# Patient Record
Sex: MALE | Race: WHITE | Employment: OTHER | URBAN - NONMETROPOLITAN AREA
[De-identification: names, ages, dates, MRNs, and addresses within clinical notes are randomized per-mention and may not be internally consistent; named-entity substitution may affect disease eponyms.]

---

## 2017-01-12 ENCOUNTER — OFFICE VISIT (OUTPATIENT)
Dept: UROLOGY | Age: 67
End: 2017-01-12

## 2017-01-12 ENCOUNTER — TELEPHONE (OUTPATIENT)
Dept: UROLOGY | Age: 67
End: 2017-01-12

## 2017-01-12 VITALS
SYSTOLIC BLOOD PRESSURE: 158 MMHG | BODY MASS INDEX: 35 KG/M2 | DIASTOLIC BLOOD PRESSURE: 88 MMHG | HEIGHT: 71 IN | WEIGHT: 250 LBS

## 2017-01-12 DIAGNOSIS — I10 ESSENTIAL HYPERTENSION: ICD-10-CM

## 2017-01-12 DIAGNOSIS — C61 PROSTATE CANCER (HCC): Primary | ICD-10-CM

## 2017-01-12 DIAGNOSIS — Z01.818 PRE-OP TESTING: ICD-10-CM

## 2017-01-12 DIAGNOSIS — E78.49 OTHER HYPERLIPIDEMIA: ICD-10-CM

## 2017-01-12 DIAGNOSIS — C61 CANCER OF PROSTATE W/MED RECUR RISK (T2B-C OR GLEASON 7 OR PSA 10-20) (HCC): Primary | ICD-10-CM

## 2017-01-12 PROCEDURE — 99214 OFFICE O/P EST MOD 30 MIN: CPT | Performed by: UROLOGY

## 2017-01-23 ENCOUNTER — TELEPHONE (OUTPATIENT)
Dept: UROLOGY | Age: 67
End: 2017-01-23

## 2017-02-14 ENCOUNTER — TELEPHONE (OUTPATIENT)
Dept: UROLOGY | Age: 67
End: 2017-02-14

## 2017-02-21 LAB
ALBUMIN SERPL-MCNC: 4.3 G/DL
ALP BLD-CCNC: 69 U/L
ALT SERPL-CCNC: 30 U/L
AST SERPL-CCNC: 25 U/L
BASOPHILS ABSOLUTE: 0 /ΜL
BASOPHILS RELATIVE PERCENT: 0.7 %
BILIRUB SERPL-MCNC: 0.7 MG/DL (ref 0.1–1.4)
BUN BLDV-MCNC: 16 MG/DL
CALCIUM SERPL-MCNC: 9.3 MG/DL
CHLORIDE BLD-SCNC: 99 MMOL/L
CO2: 29.3 MMOL/L
CREAT SERPL-MCNC: 0.9 MG/DL
EOSINOPHILS ABSOLUTE: 0.3 /ΜL
EOSINOPHILS RELATIVE PERCENT: 5.2 %
GFR CALCULATED: NORMAL
GLUCOSE BLD-MCNC: 125 MG/DL
HCT VFR BLD CALC: 44.4 % (ref 41–53)
HEMOGLOBIN: 15.6 G/DL (ref 13.5–17.5)
LYMPHOCYTES ABSOLUTE: 1.7 /ΜL
LYMPHOCYTES RELATIVE PERCENT: 27.5 %
MCH RBC QN AUTO: 30.7 PG
MCHC RBC AUTO-ENTMCNC: 35.1 G/DL
MCV RBC AUTO: 87.4 FL
MONOCYTES ABSOLUTE: 0.8 /ΜL
MONOCYTES RELATIVE PERCENT: 12.8 %
NEUTROPHILS ABSOLUTE: 3.3 /ΜL
NEUTROPHILS RELATIVE PERCENT: 53.6 %
PDW BLD-RTO: NORMAL %
PLATELET # BLD: 200 K/ΜL
PMV BLD AUTO: 11.1 FL
POTASSIUM SERPL-SCNC: 3.4 MMOL/L
RBC # BLD: 5.08 10^6/ΜL
SODIUM BLD-SCNC: 141 MMOL/L
TOTAL PROTEIN: 6.5
WBC # BLD: 6.1 10^3/ML

## 2017-02-23 ENCOUNTER — TELEPHONE (OUTPATIENT)
Dept: UROLOGY | Age: 67
End: 2017-02-23

## 2017-02-28 LAB
BILIRUBIN URINE: ABNORMAL MG/DL
BLOOD, URINE: NEGATIVE
CLARITY: ABNORMAL
COLOR: ABNORMAL
GLUCOSE URINE: NEGATIVE
KETONES, URINE: NEGATIVE
LEUKOCYTE ESTERASE, URINE: ABNORMAL
NITRITE, URINE: NEGATIVE
PH UA: 7 (ref 4.5–8)
PROTEIN UA: NEGATIVE
SPECIFIC GRAVITY UA: 1.02 (ref 1–1.03)
UROBILINOGEN, URINE: NORMAL

## 2017-03-01 ENCOUNTER — TELEPHONE (OUTPATIENT)
Dept: UROLOGY | Age: 67
End: 2017-03-01

## 2017-03-02 ENCOUNTER — TELEPHONE (OUTPATIENT)
Dept: UROLOGY | Age: 67
End: 2017-03-02

## 2017-03-08 ENCOUNTER — TELEPHONE (OUTPATIENT)
Dept: UROLOGY | Age: 67
End: 2017-03-08

## 2017-03-16 ENCOUNTER — TELEPHONE (OUTPATIENT)
Dept: UROLOGY | Age: 67
End: 2017-03-16

## 2017-03-16 ENCOUNTER — OFFICE VISIT (OUTPATIENT)
Dept: UROLOGY | Age: 67
End: 2017-03-16

## 2017-03-16 VITALS
WEIGHT: 248 LBS | HEIGHT: 71 IN | DIASTOLIC BLOOD PRESSURE: 92 MMHG | SYSTOLIC BLOOD PRESSURE: 144 MMHG | BODY MASS INDEX: 34.72 KG/M2

## 2017-03-16 DIAGNOSIS — C61 PROSTATE CANCER (HCC): Primary | ICD-10-CM

## 2017-03-16 LAB
BILIRUBIN URINE: NEGATIVE
BLOOD URINE, POC: NEGATIVE
CHARACTER, URINE: CLEAR
COLOR, URINE: YELLOW
GLUCOSE URINE: NEGATIVE MG/DL
KETONES, URINE: NEGATIVE
LEUKOCYTE CLUMPS, URINE: NEGATIVE
NITRITE, URINE: NEGATIVE
PH, URINE: 6.5
PROTEIN, URINE: NEGATIVE MG/DL
SPECIFIC GRAVITY, URINE: 1.02 (ref 1–1.03)
UROBILINOGEN, URINE: 0.2 EU/DL

## 2017-03-16 PROCEDURE — 81003 URINALYSIS AUTO W/O SCOPE: CPT | Performed by: UROLOGY

## 2017-03-16 PROCEDURE — 99214 OFFICE O/P EST MOD 30 MIN: CPT | Performed by: UROLOGY

## 2017-03-16 RX ORDER — LANOLIN ALCOHOL/MO/W.PET/CERES
500 CREAM (GRAM) TOPICAL NIGHTLY
COMMUNITY

## 2017-03-24 ENCOUNTER — TELEPHONE (OUTPATIENT)
Dept: UROLOGY | Age: 67
End: 2017-03-24

## 2017-03-28 ENCOUNTER — TELEPHONE (OUTPATIENT)
Dept: UROLOGY | Age: 67
End: 2017-03-28

## 2017-03-28 DIAGNOSIS — C61 PROSTATE CANCER (HCC): Primary | ICD-10-CM

## 2017-03-28 DIAGNOSIS — Z97.8 FOLEY CATHETER IN PLACE: ICD-10-CM

## 2017-04-03 ENCOUNTER — TELEPHONE (OUTPATIENT)
Dept: UROLOGY | Age: 67
End: 2017-04-03

## 2017-04-05 ENCOUNTER — TELEPHONE (OUTPATIENT)
Dept: UROLOGY | Age: 67
End: 2017-04-05

## 2017-04-11 ENCOUNTER — NURSE ONLY (OUTPATIENT)
Dept: UROLOGY | Age: 67
End: 2017-04-11

## 2017-04-11 VITALS
BODY MASS INDEX: 34.72 KG/M2 | WEIGHT: 248 LBS | SYSTOLIC BLOOD PRESSURE: 144 MMHG | DIASTOLIC BLOOD PRESSURE: 82 MMHG | HEIGHT: 71 IN

## 2017-04-11 DIAGNOSIS — C61 CANCER OF PROSTATE W/MED RECUR RISK (T2B-C OR GLEASON 7 OR PSA 10-20) (HCC): Primary | ICD-10-CM

## 2017-04-11 PROCEDURE — 99024 POSTOP FOLLOW-UP VISIT: CPT | Performed by: UROLOGY

## 2017-04-26 ENCOUNTER — TELEPHONE (OUTPATIENT)
Dept: UROLOGY | Age: 67
End: 2017-04-26

## 2017-05-24 ENCOUNTER — TELEPHONE (OUTPATIENT)
Dept: UROLOGY | Age: 67
End: 2017-05-24

## 2017-06-30 LAB — PROSTATE SPECIFIC ANTIGEN: NORMAL NG/ML

## 2017-07-11 ENCOUNTER — OFFICE VISIT (OUTPATIENT)
Dept: UROLOGY | Age: 67
End: 2017-07-11

## 2017-07-11 VITALS
DIASTOLIC BLOOD PRESSURE: 78 MMHG | BODY MASS INDEX: 33.96 KG/M2 | SYSTOLIC BLOOD PRESSURE: 150 MMHG | HEIGHT: 71 IN | WEIGHT: 242.6 LBS

## 2017-07-11 DIAGNOSIS — C61 PROSTATE CANCER (HCC): ICD-10-CM

## 2017-07-11 DIAGNOSIS — N52.31 ERECTILE DYSFUNCTION FOLLOWING RADICAL PROSTATECTOMY: ICD-10-CM

## 2017-07-11 DIAGNOSIS — N39.498 OTHER URINARY INCONTINENCE: Primary | ICD-10-CM

## 2017-07-11 LAB — POST VOID RESIDUAL (PVR): 184 ML

## 2017-07-11 PROCEDURE — 99213 OFFICE O/P EST LOW 20 MIN: CPT | Performed by: UROLOGY

## 2017-07-11 PROCEDURE — 51798 US URINE CAPACITY MEASURE: CPT | Performed by: UROLOGY

## 2017-07-11 RX ORDER — OXYBUTYNIN CHLORIDE 5 MG/1
5 TABLET ORAL 3 TIMES DAILY
Qty: 90 TABLET | Refills: 3 | Status: SHIPPED | OUTPATIENT
Start: 2017-07-11 | End: 2017-09-13 | Stop reason: ALTCHOICE

## 2017-07-11 RX ORDER — TADALAFIL 5 MG
5 TABLET ORAL PRN
Qty: 30 TABLET | Refills: 3 | Status: SHIPPED | OUTPATIENT
Start: 2017-07-11 | End: 2017-10-12 | Stop reason: SDUPTHER

## 2017-08-07 ENCOUNTER — TELEPHONE (OUTPATIENT)
Dept: UROLOGY | Age: 67
End: 2017-08-07

## 2017-08-07 RX ORDER — FLAVOXATE HYDROCHLORIDE 100 MG/1
100 TABLET ORAL 3 TIMES DAILY PRN
Qty: 30 TABLET | Refills: 3 | Status: SHIPPED | OUTPATIENT
Start: 2017-08-07 | End: 2017-10-03 | Stop reason: SDUPTHER

## 2017-09-13 ENCOUNTER — OFFICE VISIT (OUTPATIENT)
Dept: NEPHROLOGY | Age: 67
End: 2017-09-13
Payer: COMMERCIAL

## 2017-09-13 VITALS
DIASTOLIC BLOOD PRESSURE: 91 MMHG | HEART RATE: 69 BPM | SYSTOLIC BLOOD PRESSURE: 168 MMHG | BODY MASS INDEX: 33.67 KG/M2 | OXYGEN SATURATION: 97 % | WEIGHT: 241.4 LBS

## 2017-09-13 DIAGNOSIS — E87.6 HYPOKALEMIA: Primary | ICD-10-CM

## 2017-09-13 PROCEDURE — 99203 OFFICE O/P NEW LOW 30 MIN: CPT | Performed by: INTERNAL MEDICINE

## 2017-09-13 RX ORDER — CHLORAL HYDRATE 500 MG
3000 CAPSULE ORAL DAILY
COMMUNITY
End: 2019-12-05

## 2017-09-13 RX ORDER — CHLORTHALIDONE 25 MG/1
25 TABLET ORAL DAILY
COMMUNITY
End: 2018-11-12 | Stop reason: ALTCHOICE

## 2017-09-13 RX ORDER — SPIRONOLACTONE 25 MG/1
25 TABLET ORAL DAILY
Qty: 100 TABLET | Refills: 3 | Status: SHIPPED | OUTPATIENT
Start: 2017-09-13 | End: 2018-10-16 | Stop reason: SDUPTHER

## 2017-09-13 RX ORDER — POTASSIUM CHLORIDE 1.5 G/1.77G
20 POWDER, FOR SOLUTION ORAL 2 TIMES DAILY
COMMUNITY
End: 2017-09-13

## 2017-09-13 ASSESSMENT — ENCOUNTER SYMPTOMS
SHORTNESS OF BREATH: 0
ABDOMINAL DISTENTION: 0
BACK PAIN: 0
GASTROINTESTINAL NEGATIVE: 1
DIARRHEA: 0
NAUSEA: 0
WHEEZING: 0
CHEST TIGHTNESS: 0
RESPIRATORY NEGATIVE: 1
BLOOD IN STOOL: 0
ABDOMINAL PAIN: 0
VOMITING: 0
CONSTIPATION: 0
FACIAL SWELLING: 0
COUGH: 0

## 2017-09-14 ENCOUNTER — TELEPHONE (OUTPATIENT)
Dept: NEPHROLOGY | Age: 67
End: 2017-09-14

## 2017-09-28 ENCOUNTER — TELEPHONE (OUTPATIENT)
Dept: NEPHROLOGY | Age: 67
End: 2017-09-28

## 2017-09-28 LAB
BUN BLDV-MCNC: 21 MG/DL
CALCIUM SERPL-MCNC: 9.2 MG/DL
CHLORIDE BLD-SCNC: 101 MMOL/L
CO2: 25.8 MMOL/L
CREAT SERPL-MCNC: 1 MG/DL
GFR CALCULATED: >60
GLUCOSE BLD-MCNC: 114 MG/DL
POTASSIUM SERPL-SCNC: 3.6 MMOL/L
PROSTATE SPECIFIC ANTIGEN: NORMAL NG/ML
SODIUM BLD-SCNC: 140 MMOL/L

## 2017-10-02 DIAGNOSIS — C61 CANCER OF PROSTATE W/MED RECUR RISK (T2B-C OR GLEASON 7 OR PSA 10-20) (HCC): ICD-10-CM

## 2017-10-03 ENCOUNTER — TELEPHONE (OUTPATIENT)
Dept: UROLOGY | Age: 67
End: 2017-10-03

## 2017-10-03 RX ORDER — FLAVOXATE HYDROCHLORIDE 100 MG/1
100 TABLET ORAL 3 TIMES DAILY PRN
Qty: 30 TABLET | Refills: 3 | Status: SHIPPED | OUTPATIENT
Start: 2017-10-03 | End: 2017-12-02 | Stop reason: SDUPTHER

## 2017-10-03 NOTE — TELEPHONE ENCOUNTER
Patient requesting refill of flavoxate through My Chart. Last sent by [facility]. Could you please advise?     LOV: 7/11/17  NOV: 10/12/17    Pharmacy: [address] [place] [address] Carlos    Thank you

## 2017-10-03 NOTE — TELEPHONE ENCOUNTER
----- Message from Tyree Adkins MD sent at 10/2/2017  9:02 PM EDT -----  Please make sure that he comes with a CT scan CD

## 2017-10-05 ENCOUNTER — TELEPHONE (OUTPATIENT)
Dept: UROLOGY | Age: 67
End: 2017-10-05

## 2017-10-12 ENCOUNTER — OFFICE VISIT (OUTPATIENT)
Dept: UROLOGY | Age: 67
End: 2017-10-12
Payer: COMMERCIAL

## 2017-10-12 VITALS
BODY MASS INDEX: 33.74 KG/M2 | DIASTOLIC BLOOD PRESSURE: 84 MMHG | SYSTOLIC BLOOD PRESSURE: 142 MMHG | HEIGHT: 71 IN | WEIGHT: 241 LBS

## 2017-10-12 DIAGNOSIS — N28.1 RENAL CYST: ICD-10-CM

## 2017-10-12 DIAGNOSIS — C61 PROSTATE CANCER (HCC): Primary | ICD-10-CM

## 2017-10-12 DIAGNOSIS — N52.31 ERECTILE DYSFUNCTION FOLLOWING RADICAL PROSTATECTOMY: ICD-10-CM

## 2017-10-12 PROCEDURE — 99213 OFFICE O/P EST LOW 20 MIN: CPT | Performed by: UROLOGY

## 2017-10-12 RX ORDER — TADALAFIL 20 MG/1
20 TABLET ORAL PRN
Qty: 6 TABLET | Refills: 5 | Status: SHIPPED | OUTPATIENT
Start: 2017-10-12 | End: 2018-01-12 | Stop reason: ALTCHOICE

## 2017-11-07 ENCOUNTER — TELEPHONE (OUTPATIENT)
Dept: NEPHROLOGY | Age: 67
End: 2017-11-07

## 2017-11-07 LAB
BUN BLDV-MCNC: 25 MG/DL
CALCIUM SERPL-MCNC: 8.8 MG/DL
CHLORIDE BLD-SCNC: 103 MMOL/L
CO2: 23 MMOL/L
CREAT SERPL-MCNC: 1.1 MG/DL
GFR CALCULATED: >60
GLUCOSE BLD-MCNC: 121 MG/DL
POTASSIUM SERPL-SCNC: 3.7 MMOL/L
SODIUM BLD-SCNC: 141 MMOL/L

## 2017-11-30 ENCOUNTER — TELEPHONE (OUTPATIENT)
Dept: UROLOGY | Age: 67
End: 2017-11-30

## 2017-12-04 NOTE — TELEPHONE ENCOUNTER
Please see refill request for flavoxate. Patient pharmacy: [place]: 10/12/17  NOV: 01/08/2018    Please advise. Thank you.

## 2017-12-05 RX ORDER — FLAVOXATE HYDROCHLORIDE 100 MG/1
TABLET ORAL
Qty: 30 TABLET | Refills: 3 | Status: SHIPPED | OUTPATIENT
Start: 2017-12-05 | End: 2018-02-05 | Stop reason: SDUPTHER

## 2017-12-12 ENCOUNTER — OFFICE VISIT (OUTPATIENT)
Dept: NEPHROLOGY | Age: 67
End: 2017-12-12
Payer: COMMERCIAL

## 2017-12-12 VITALS
DIASTOLIC BLOOD PRESSURE: 84 MMHG | OXYGEN SATURATION: 98 % | HEART RATE: 79 BPM | BODY MASS INDEX: 34.66 KG/M2 | WEIGHT: 248.5 LBS | SYSTOLIC BLOOD PRESSURE: 138 MMHG

## 2017-12-12 DIAGNOSIS — I10 ESSENTIAL HYPERTENSION: Primary | ICD-10-CM

## 2017-12-12 LAB
BACTERIA URINE, POC: NORMAL
BILIRUBIN URINE: NORMAL MG/DL
BLOOD, URINE: NEGATIVE
CASTS URINE, POC: NORMAL
CLARITY: CLEAR
COLOR: YELLOW
CRYSTALS URINE, POC: NORMAL
EPI CELLS URINE, POC: NORMAL
GLUCOSE URINE: NEGATIVE
KETONES, URINE: NEGATIVE
LEUKOCYTE EST, POC: NEGATIVE
NITRITE, URINE: NEGATIVE
PH UA: 5 (ref 4.5–8)
PROTEIN UA: NEGATIVE
RBC URINE, POC: NORMAL
SPECIFIC GRAVITY UA: NORMAL (ref 1–1.03)
UROBILINOGEN, URINE: NORMAL
WBC URINE, POC: NORMAL
YEAST URINE, POC: NORMAL

## 2017-12-12 PROCEDURE — 99213 OFFICE O/P EST LOW 20 MIN: CPT | Performed by: INTERNAL MEDICINE

## 2017-12-12 PROCEDURE — G8417 CALC BMI ABV UP PARAM F/U: HCPCS | Performed by: INTERNAL MEDICINE

## 2017-12-12 PROCEDURE — 1123F ACP DISCUSS/DSCN MKR DOCD: CPT | Performed by: INTERNAL MEDICINE

## 2017-12-12 PROCEDURE — 81000 URINALYSIS NONAUTO W/SCOPE: CPT | Performed by: INTERNAL MEDICINE

## 2017-12-12 PROCEDURE — 3017F COLORECTAL CA SCREEN DOC REV: CPT | Performed by: INTERNAL MEDICINE

## 2017-12-12 PROCEDURE — G8484 FLU IMMUNIZE NO ADMIN: HCPCS | Performed by: INTERNAL MEDICINE

## 2017-12-12 PROCEDURE — 4040F PNEUMOC VAC/ADMIN/RCVD: CPT | Performed by: INTERNAL MEDICINE

## 2017-12-12 PROCEDURE — 1036F TOBACCO NON-USER: CPT | Performed by: INTERNAL MEDICINE

## 2017-12-12 PROCEDURE — G8427 DOCREV CUR MEDS BY ELIG CLIN: HCPCS | Performed by: INTERNAL MEDICINE

## 2017-12-12 ASSESSMENT — ENCOUNTER SYMPTOMS
GASTROINTESTINAL NEGATIVE: 1
RESPIRATORY NEGATIVE: 1

## 2017-12-12 NOTE — PROGRESS NOTES
Visit Date: 12/12/2017      HPI:     Elvis Gifford is a 78 y.o. male who presents today for:  Chief Complaint   Patient presents with    Hypertension    Chronic Kidney Disease     Stage II       Current Outpatient Prescriptions   Medication Sig Dispense Refill    flavoxate (URISPAS) 100 MG tablet TAKE ONE TABLET BY MOUTH THREE TIMES DAILY AS NEEDED FOR MUSCLE SPASM 30 tablet 3    tadalafil (CIALIS) 20 MG tablet Take 1 tablet by mouth as needed for Erectile Dysfunction 6 tablet 5    chlorthalidone (HYGROTON) 25 MG tablet Take 25 mg by mouth daily      Omega-3 Fatty Acids (FISH OIL) 1000 MG CAPS Take 3,000 mg by mouth daily      aspirin 81 MG tablet Take 81 mg by mouth daily      hydrocortisone 2.5 % cream       spironolactone (ALDACTONE) 25 MG tablet Take 1 tablet by mouth daily 100 tablet 3    Multiple Vitamins-Minerals (MULTIVITAMIN PO) Take by mouth daily      niacin 500 MG extended release capsule Take 500 mg by mouth nightly      lisinopril-hydrochlorothiazide (PRINZIDE;ZESTORETIC) 20-25 MG per tablet Take 1 tablet by mouth 2 times daily       carvedilol (COREG) 12.5 MG tablet Take 12.5 mg by mouth 2 times daily (with meals)      atorvastatin (LIPITOR) 40 MG tablet Take 40 mg by mouth nightly        No current facility-administered medications for this visit.       Allergies   Allergen Reactions    Amoxicillin Anaphylaxis    Penicillins Anaphylaxis       Past Medical History:   Diagnosis Date    Appendicitis 1955    Hyperlipidemia     Hypertension       Past Surgical History:   Procedure Laterality Date    APPENDECTOMY      OTHER SURGICAL HISTORY  03/24/2017    Lysis of adhesions    PROSTATECTOMY  03/24/2017    Robotic, bilateral pelvic lymphadenectomy     Family History   Problem Relation Age of Onset    Cancer Mother      breast    Breast Cancer Mother     High Blood Pressure Mother     Cancer Father      lung    Heart Disease Neg Hx     Diabetes Neg Hx     Stroke Neg Hx      Social History   Substance Use Topics    Smoking status: Never Smoker    Smokeless tobacco: Never Used    Alcohol use Yes      Comment: some        Subjective:      Review of Systems   Constitutional: Negative. HENT: Negative. Respiratory: Negative. Cardiovascular: Negative. Gastrointestinal: Negative. Genitourinary:        Recent prostatic surgery for neoplasm   Some urinary incontinance and impotence  Speculator 7   Musculoskeletal: Negative. Skin: Negative. Neurological: Negative. Psychiatric/Behavioral: Negative. Objective:     /84 (Site: Left Arm, Position: Sitting)   Pulse 79   Wt 248 lb 8 oz (112.7 kg)   SpO2 98%   BMI 34.66 kg/m²     Physical Exam   Constitutional: He is oriented to person, place, and time. He appears well-developed and well-nourished. No distress. Neck: No JVD present. Cardiovascular: Normal rate, regular rhythm, normal heart sounds and intact distal pulses. Exam reveals no gallop and no friction rub. No murmur heard. Pulmonary/Chest: Effort normal and breath sounds normal. No respiratory distress. He has no wheezes. He has no rales. He exhibits no tenderness. Abdominal: Soft. Bowel sounds are normal. He exhibits no distension. There is no tenderness. There is no guarding. Musculoskeletal: Normal range of motion. He exhibits no edema or tenderness. Lymphadenopathy:     He has no cervical adenopathy. Neurological: He is alert and oriented to person, place, and time. Skin: Skin is warm and dry. No rash noted. He is not diaphoretic. No pallor. Psychiatric: He has a normal mood and affect. His behavior is normal. Judgment and thought content normal.   Nursing note and vitals reviewed.     CBC:   Lab Results   Component Value Date    WBC 10.3 04/03/2017    HGB 15.7 04/03/2017    HCT 45.9 04/03/2017    MCV 87.6 04/03/2017     04/03/2017     BMP:    Lab Results   Component Value Date     11/07/2017     09/28/2017     04/03/2017    K 3.7 11/07/2017    K 3.6 09/28/2017    K 4.1 04/03/2017     11/07/2017     09/28/2017     04/03/2017    CO2 23.0 11/07/2017    CO2 25.8 09/28/2017    CO2 23 04/03/2017    BUN 25 11/07/2017    BUN 21 09/28/2017    BUN 24 (H) 04/03/2017    CREATININE 1.1 11/07/2017    CREATININE 1.0 09/28/2017    CREATININE 0.9 04/03/2017    GLUCOSE 121 11/07/2017    GLUCOSE 114 09/28/2017    GLUCOSE 130 (H) 04/03/2017      Hepatic:   Lab Results   Component Value Date    AST 21 04/03/2017    AST 25 02/21/2017    ALT 31 04/03/2017    ALT 30 02/21/2017    BILITOT 0.7 04/03/2017    BILITOT 0.7 02/21/2017    ALKPHOS 84 04/03/2017    ALKPHOS 69 02/21/2017     BNP: No results found for: BNP  Lipids: No results found for: CHOL, HDL  INR:   Lab Results   Component Value Date    INR 1.01 03/24/2017          URINE:   Lab Results   Component Value Date    PROTUR Negative 03/16/2017                               Lab Results   Component Value Date    NITRU Negative 03/16/2017    COLORU Yellow 03/16/2017    COLORU Light Yellow 02/28/2017    PHUR 7.0 02/28/2017    CLARITYU Slightly Cloudy 02/28/2017    SPECGRAV 1.020 02/28/2017    UROBILINOGEN 0.20 03/16/2017    BILIRUBINUR Negative 03/16/2017    BLOODU Negative 03/16/2017    BLOODU Negative 02/28/2017    GLUCOSEU Negative 03/16/2017    KETUA Negative 03/16/2017         Microalbumen/Creatinine ratio:  No components found for: RUCREAT    Assessment:     1.  Essential hypertension  POC URINE with Microscopic   prostatic cancer   Chronic kidney disease stage 2          Plan:   Call more bp data  Serial labs   Discussed with patient  Fu annually        Louis Perez DO

## 2017-12-21 LAB
BUN BLDV-MCNC: 22 MG/DL
CALCIUM SERPL-MCNC: 9.6 MG/DL
CHLORIDE BLD-SCNC: 102 MMOL/L
CO2: 29 MMOL/L
CREAT SERPL-MCNC: 1.4 MG/DL
GFR CALCULATED: 54
GLUCOSE BLD-MCNC: 112 MG/DL
POTASSIUM SERPL-SCNC: 4.1 MMOL/L
PROSTATE SPECIFIC ANTIGEN: NORMAL NG/ML
SODIUM BLD-SCNC: 141 MMOL/L

## 2018-01-12 ENCOUNTER — OFFICE VISIT (OUTPATIENT)
Dept: UROLOGY | Age: 68
End: 2018-01-12
Payer: MEDICARE

## 2018-01-12 VITALS
WEIGHT: 254 LBS | DIASTOLIC BLOOD PRESSURE: 80 MMHG | HEIGHT: 71 IN | BODY MASS INDEX: 35.56 KG/M2 | SYSTOLIC BLOOD PRESSURE: 142 MMHG

## 2018-01-12 DIAGNOSIS — N39.3 STRESS INCONTINENCE: ICD-10-CM

## 2018-01-12 DIAGNOSIS — N52.31 ERECTILE DYSFUNCTION FOLLOWING RADICAL PROSTATECTOMY: ICD-10-CM

## 2018-01-12 DIAGNOSIS — C61 MALIGNANT NEOPLASM OF PROSTATE (HCC): Primary | ICD-10-CM

## 2018-01-12 LAB
BILIRUBIN URINE: NEGATIVE
BLOOD URINE, POC: NEGATIVE
CHARACTER, URINE: CLEAR
COLOR, URINE: YELLOW
GLUCOSE URINE: NEGATIVE MG/DL
KETONES, URINE: NEGATIVE
LEUKOCYTE CLUMPS, URINE: NEGATIVE
NITRITE, URINE: NEGATIVE
PH, URINE: 5.5
PROTEIN, URINE: NEGATIVE MG/DL
SPECIFIC GRAVITY, URINE: 1.02 (ref 1–1.03)
UROBILINOGEN, URINE: 0.2 EU/DL

## 2018-01-12 PROCEDURE — 1036F TOBACCO NON-USER: CPT | Performed by: NURSE PRACTITIONER

## 2018-01-12 PROCEDURE — 4040F PNEUMOC VAC/ADMIN/RCVD: CPT | Performed by: NURSE PRACTITIONER

## 2018-01-12 PROCEDURE — 99213 OFFICE O/P EST LOW 20 MIN: CPT | Performed by: NURSE PRACTITIONER

## 2018-01-12 PROCEDURE — G8427 DOCREV CUR MEDS BY ELIG CLIN: HCPCS | Performed by: NURSE PRACTITIONER

## 2018-01-12 PROCEDURE — 3017F COLORECTAL CA SCREEN DOC REV: CPT | Performed by: NURSE PRACTITIONER

## 2018-01-12 PROCEDURE — G8484 FLU IMMUNIZE NO ADMIN: HCPCS | Performed by: NURSE PRACTITIONER

## 2018-01-12 PROCEDURE — G8417 CALC BMI ABV UP PARAM F/U: HCPCS | Performed by: NURSE PRACTITIONER

## 2018-01-12 PROCEDURE — 1123F ACP DISCUSS/DSCN MKR DOCD: CPT | Performed by: NURSE PRACTITIONER

## 2018-01-12 PROCEDURE — 81003 URINALYSIS AUTO W/O SCOPE: CPT | Performed by: NURSE PRACTITIONER

## 2018-01-12 NOTE — PROGRESS NOTES
medications for this visit. Past Medical History  Judah Lozada  has a past medical history of Appendicitis; Hyperlipidemia; and Hypertension. Past Surgical History  The patient  has a past surgical history that includes Appendectomy; ostatectomy (03/24/2017); and other surgical history (03/24/2017). Family History  This patient's family history includes Breast Cancer in his mother; Cancer in his father and mother; High Blood Pressure in his mother. Social History  Judah Lozada  reports that he has never smoked. He has never used smokeless tobacco. He reports that he drinks alcohol. He reports that he does not use drugs. Subjective:     Review of Systems  No problems with ears, nose or throat. No problems with eyes. No chest pain, shortness of breath, abdominal pain, extremity pain or weakness, and no neurological deficits. No rashes.  symptoms per HPI. The remainder of the review of symptoms is negative. Objective:     PE:   Vitals:    01/12/18 1034   BP: (!) 142/80   Weight: 254 lb (115.2 kg)   Height: 5' 11\" (1.803 m)       Constitutional: Alert and oriented times 3, no acute distress and cooperative to examination with appropriate mood and affect. HENT:   Head:        Normocephalic and atraumatic. Mouth/Throat:         Mucous membranes are normal.   Eyes:         EOM are normal. No scleral icterus. PERRLA. Neck:        Supple, symmetrical, trachea midline  Pulmonary/Chest:      Chest symmetric with normal A/P diameter,  Normal respiratory rate and rhthym. No use of accessory muscles. Abdominal:         Soft. No tenderness. Bowel sounds present. Musculoskeletal:         Normal range of motion. No edema or tenderness of lower extremities. Extremities: No cyanosis, clubbing, or edema present. Neurological:        Alert and oriented. No cranial nerve deficit. [place] Psychiatric:        Normal mood and affect.       Labs   Urine dip demonstrates   Results for POC orders placed in visit on 01/12/18   POCT Urinalysis No Micro (Auto)   Result Value Ref Range    Glucose, Ur Negative NEGATIVE mg/dl    Bilirubin Urine Negative     Ketones, Urine Negative NEGATIVE    Specific Gravity, Urine 1.020 1.002 - 1.03    Blood, UA POC Negative NEGATIVE    pH, Urine 5.50 5.0 - 9.0    Protein, Urine Negative NEGATIVE mg/dl    Urobilinogen, Urine 0.20 0.0 - 1.0 eu/dl    Nitrite, Urine Negative NEGATIVE    Leukocyte Clumps, Urine Negative NEGATIVE    Color, Urine Yellow YELLOW-STR    Character, Urine Clear [email or web address] [email or web address]       Patients recent PSA values are as follows  Lab Results   Component Value Date    PSA  12/21/2017      Comment:      <0.03    PSA  09/28/2017      Comment:      <0.03    PSA  06/30/2017      Comment:      <0.03        Recent BUN/Creatinine:  Lab Results   Component Value Date    BUN 22 12/21/2017    CREATININE 1.4 12/21/2017       Surgical Pathology  FINAL DIAGNOSIS:  A - Lymph nodes, right pelvic:   Five benign lymph nodes. B - Lymph nodes, left pelvic:   Nine benign lymph nodes. C - Prostate, radical prostatectomy:   Invasive adenocarcinoma of prostate.   [place]'s combined score: 7 (3+4), Grade Group 2.   Approximately 15% of prostate tissue involved by tumor.   Tumor involves both right and left lobes.   No evidence of extraprostatic extension.   Margins of resection are clear.   No evidence of seminal vesicle involvement.   Perineural invasion is present.   See microscopic description for additional prognostic indicators. Assessment & Plan:     Prostate Cancer- pT2c, pN0  Stress Incontinence  Erectile Dysfunction    Huan Rodarte follows up today for his prostate cancer. He underwent RALRP with Dr. Mabel Gray on 03/24/2017. Surgical pathology revealed Khloe's score 3+4=7, grade group 2. No adverse features. PSA remains undetectable which is great. He will obtain PSA in 4 months. He is to continue pelvic floor therapy. Regarding his ED, has tried Cialis with no benefit.  Discussed Trimix

## 2018-05-03 LAB — PROSTATE SPECIFIC ANTIGEN: NORMAL NG/ML

## 2018-05-11 ENCOUNTER — OFFICE VISIT (OUTPATIENT)
Dept: UROLOGY | Age: 68
End: 2018-05-11
Payer: MEDICARE

## 2018-05-11 VITALS
SYSTOLIC BLOOD PRESSURE: 134 MMHG | WEIGHT: 240 LBS | DIASTOLIC BLOOD PRESSURE: 88 MMHG | BODY MASS INDEX: 33.6 KG/M2 | HEIGHT: 71 IN

## 2018-05-11 DIAGNOSIS — C61 MALIGNANT NEOPLASM OF PROSTATE (HCC): Primary | ICD-10-CM

## 2018-05-11 LAB
BILIRUBIN URINE: NEGATIVE
BLOOD URINE, POC: NEGATIVE
CHARACTER, URINE: CLEAR
COLOR, URINE: YELLOW
GLUCOSE URINE: NEGATIVE MG/DL
KETONES, URINE: NEGATIVE
LEUKOCYTE CLUMPS, URINE: NEGATIVE
NITRITE, URINE: NEGATIVE
PH, URINE: 5
PROTEIN, URINE: NEGATIVE MG/DL
SPECIFIC GRAVITY, URINE: 1.02 (ref 1–1.03)
UROBILINOGEN, URINE: 0.2 EU/DL

## 2018-05-11 PROCEDURE — G8417 CALC BMI ABV UP PARAM F/U: HCPCS | Performed by: NURSE PRACTITIONER

## 2018-05-11 PROCEDURE — 81003 URINALYSIS AUTO W/O SCOPE: CPT | Performed by: NURSE PRACTITIONER

## 2018-05-11 PROCEDURE — 1123F ACP DISCUSS/DSCN MKR DOCD: CPT | Performed by: NURSE PRACTITIONER

## 2018-05-11 PROCEDURE — 1036F TOBACCO NON-USER: CPT | Performed by: NURSE PRACTITIONER

## 2018-05-11 PROCEDURE — 4040F PNEUMOC VAC/ADMIN/RCVD: CPT | Performed by: NURSE PRACTITIONER

## 2018-05-11 PROCEDURE — 99213 OFFICE O/P EST LOW 20 MIN: CPT | Performed by: NURSE PRACTITIONER

## 2018-05-11 PROCEDURE — G8427 DOCREV CUR MEDS BY ELIG CLIN: HCPCS | Performed by: NURSE PRACTITIONER

## 2018-05-11 PROCEDURE — 3017F COLORECTAL CA SCREEN DOC REV: CPT | Performed by: NURSE PRACTITIONER

## 2018-08-06 RX ORDER — FLAVOXATE HYDROCHLORIDE 100 MG/1
TABLET ORAL
Qty: 90 TABLET | Refills: 3 | Status: SHIPPED | OUTPATIENT
Start: 2018-08-06 | End: 2018-08-08 | Stop reason: SDUPTHER

## 2018-08-08 ENCOUNTER — TELEPHONE (OUTPATIENT)
Dept: UROLOGY | Age: 68
End: 2018-08-08

## 2018-08-08 RX ORDER — FLAVOXATE HYDROCHLORIDE 100 MG/1
TABLET ORAL
Qty: 90 TABLET | Refills: 3 | Status: SHIPPED | OUTPATIENT
Start: 2018-08-08 | End: 2018-11-12 | Stop reason: ALTCHOICE

## 2018-08-08 NOTE — TELEPHONE ENCOUNTER
Nora Laurent  [facility] would like a refill of the following medications:         flavoxate (URISPAS) 100 MG tablet Erin Kimbrough, APRN - CNP]     Preferred pharmacy: [address] [phone] - f [phone]

## 2018-10-16 RX ORDER — SPIRONOLACTONE 25 MG/1
25 TABLET ORAL DAILY
Qty: 90 TABLET | Refills: 1 | Status: SHIPPED | OUTPATIENT
Start: 2018-10-16 | End: 2019-04-04 | Stop reason: SDUPTHER

## 2018-11-05 LAB — PROSTATE SPECIFIC ANTIGEN: NORMAL NG/ML

## 2018-11-12 ENCOUNTER — OFFICE VISIT (OUTPATIENT)
Dept: UROLOGY | Age: 68
End: 2018-11-12
Payer: MEDICARE

## 2018-11-12 VITALS
BODY MASS INDEX: 35.92 KG/M2 | SYSTOLIC BLOOD PRESSURE: 132 MMHG | DIASTOLIC BLOOD PRESSURE: 74 MMHG | WEIGHT: 237 LBS | HEIGHT: 68 IN

## 2018-11-12 DIAGNOSIS — N52.9 ERECTILE DYSFUNCTION, UNSPECIFIED ERECTILE DYSFUNCTION TYPE: ICD-10-CM

## 2018-11-12 DIAGNOSIS — N39.3 STRESS INCONTINENCE: ICD-10-CM

## 2018-11-12 DIAGNOSIS — C61 MALIGNANT NEOPLASM OF PROSTATE (HCC): Primary | ICD-10-CM

## 2018-11-12 PROCEDURE — 4040F PNEUMOC VAC/ADMIN/RCVD: CPT | Performed by: NURSE PRACTITIONER

## 2018-11-12 PROCEDURE — 99213 OFFICE O/P EST LOW 20 MIN: CPT | Performed by: NURSE PRACTITIONER

## 2018-11-12 PROCEDURE — 81003 URINALYSIS AUTO W/O SCOPE: CPT | Performed by: NURSE PRACTITIONER

## 2018-11-12 PROCEDURE — G8427 DOCREV CUR MEDS BY ELIG CLIN: HCPCS | Performed by: NURSE PRACTITIONER

## 2018-11-12 PROCEDURE — 1036F TOBACCO NON-USER: CPT | Performed by: NURSE PRACTITIONER

## 2018-11-12 PROCEDURE — G8484 FLU IMMUNIZE NO ADMIN: HCPCS | Performed by: NURSE PRACTITIONER

## 2018-11-12 PROCEDURE — G8417 CALC BMI ABV UP PARAM F/U: HCPCS | Performed by: NURSE PRACTITIONER

## 2018-11-12 PROCEDURE — 3017F COLORECTAL CA SCREEN DOC REV: CPT | Performed by: NURSE PRACTITIONER

## 2018-11-12 PROCEDURE — 1123F ACP DISCUSS/DSCN MKR DOCD: CPT | Performed by: NURSE PRACTITIONER

## 2018-11-12 PROCEDURE — 1101F PT FALLS ASSESS-DOCD LE1/YR: CPT | Performed by: NURSE PRACTITIONER

## 2018-11-12 RX ORDER — SILDENAFIL CITRATE 20 MG/1
60-100 TABLET ORAL PRN
Qty: 30 TABLET | Refills: 5 | Status: SHIPPED | OUTPATIENT
Start: 2018-11-12 | End: 2018-12-06 | Stop reason: ALTCHOICE

## 2018-11-12 NOTE — PROGRESS NOTES
[address] [place] Wilde  Dept: [phone]  Loc: [phone]  Visit Date: 11/12/2018      HPI:     Alyssa Melvin f/u today for Prostate Cancer. He underwent RALRP with Dr. Deborah Esteban on 03/24/2017. Surgical Pathology revealed invasive adenocarcinoma of prostate, kendall's combined score 3+4=7, grade group 2. No evidence of extraprostatic extension, margins clear, no seminal vesical involvement, bilateral lymph node dissection negative for malignancy. PSA remains undetectable at <0.03. He continues to do well. Reports some stress incontinence, wears pads, gores through about 4 daily. Denies any erections. Has tried Cialis 20 mg which provided no benefit. Not interested in Trimix at this time. He comes in today by himself. Hx is obtained from the patient and the medical record. Current Outpatient Prescriptions   Medication Sig Dispense Refill    sildenafil (REVATIO) 20 MG tablet Take 3-5 tablets by mouth as needed (ED) 30 tablet 5    spironolactone (ALDACTONE) 25 MG tablet Take 1 tablet by mouth daily 90 tablet 1    Omega-3 Fatty Acids (FISH OIL) 1000 MG CAPS Take 3,000 mg by mouth daily      aspirin 81 MG tablet Take 81 mg by mouth daily      hydrocortisone 2.5 % cream       Multiple Vitamins-Minerals (MULTIVITAMIN PO) Take by mouth daily      niacin 500 MG extended release capsule Take 500 mg by mouth nightly      lisinopril-hydrochlorothiazide (PRINZIDE;ZESTORETIC) 20-25 MG per tablet Take 1 tablet by mouth 2 times daily       carvedilol (COREG) 12.5 MG tablet Take 12.5 mg by mouth 2 times daily (with meals)      atorvastatin (LIPITOR) 40 MG tablet Take 40 mg by mouth nightly        No current facility-administered medications for this visit. Past Medical History  Carissa Irwin  has a past medical history of Appendicitis; Hyperlipidemia; and Hypertension.     Past Surgical History  The patient  has a past Urine 1.020 1.002 - 1.03    Blood, UA POC Negative NEGATIVE    pH, Urine 5.50 5.0 - 9.0    Protein, Urine Negative NEGATIVE mg/dl    Urobilinogen, Urine 0.20 0.0 - 1.0 eu/dl    Nitrite, Urine Negative NEGATIVE    Leukocyte Clumps, Urine Negative NEGATIVE    Color, Urine Yellow YELLOW-STR    Character, Urine Clear [email or web address] [email or web address]       Patients recent PSA values are as follows  Lab Results   Component Value Date    PSA  11/05/2018      Comment:      <0.03    PSA  05/03/2018      Comment:      <0.03    PSA  12/21/2017      Comment:      <0.03        Recent BUN/Creatinine:  Lab Results   Component Value Date    BUN 22 12/21/2017    CREATININE 1.4 12/21/2017       Surgical Pathology  FINAL DIAGNOSIS:  A - Lymph nodes, right pelvic:   Five benign lymph nodes. B - Lymph nodes, left pelvic:   Nine benign lymph nodes. C - Prostate, radical prostatectomy:   Invasive adenocarcinoma of prostate.   [place]'s combined score: 7 (3+4), Grade Group 2.   Approximately 15% of prostate tissue involved by tumor.   Tumor involves both right and left lobes.   No evidence of extraprostatic extension.   Margins of resection are clear.   No evidence of seminal vesicle involvement.   Perineural invasion is present.   See microscopic description for additional prognostic indicators. Assessment & Plan:     Prostate Cancer- pT2c, pN0  Stress Incontinence  Erectile Dysfunction    Noemí Yanes f/u today for his prostate cancer. He underwent RALRP with Dr. Jeison Arciniega on 03/24/2017. Surgical pathology revealed [place]'s score 3+4=7, grade group 2. No adverse features. PSA remains undetectable which is great. He will obtain PSA in 6 months. If continues to be undetectable, plan to start following yearly. Erectile Dysfunction- Trial Revatio    Stress Incontinence- Discussed male urethral sling. Would like to think about further. Return in about 6 months (around 5/12/2019) for Prostate Cancer .     JONE Peterson  Urology

## 2018-12-03 DIAGNOSIS — I10 ESSENTIAL HYPERTENSION: Primary | ICD-10-CM

## 2018-12-03 LAB
BUN BLDV-MCNC: 25 MG/DL
CALCIUM SERPL-MCNC: 9.9 MG/DL
CHLORIDE BLD-SCNC: 104 MMOL/L
CO2: 27 MMOL/L
CREAT SERPL-MCNC: 1 MG/DL
GFR CALCULATED: 60
GLUCOSE BLD-MCNC: 111 MG/DL
POTASSIUM SERPL-SCNC: 4.4 MMOL/L
SODIUM BLD-SCNC: 143 MMOL/L

## 2018-12-06 ENCOUNTER — OFFICE VISIT (OUTPATIENT)
Dept: NEPHROLOGY | Age: 68
End: 2018-12-06
Payer: MEDICARE

## 2018-12-06 VITALS
HEART RATE: 68 BPM | RESPIRATION RATE: 18 BRPM | WEIGHT: 233 LBS | DIASTOLIC BLOOD PRESSURE: 70 MMHG | BODY MASS INDEX: 35.43 KG/M2 | SYSTOLIC BLOOD PRESSURE: 130 MMHG

## 2018-12-06 DIAGNOSIS — N18.2 CKD (CHRONIC KIDNEY DISEASE), STAGE II: Primary | ICD-10-CM

## 2018-12-06 DIAGNOSIS — I10 ESSENTIAL HYPERTENSION: ICD-10-CM

## 2018-12-06 PROCEDURE — 1123F ACP DISCUSS/DSCN MKR DOCD: CPT | Performed by: INTERNAL MEDICINE

## 2018-12-06 PROCEDURE — G8484 FLU IMMUNIZE NO ADMIN: HCPCS | Performed by: INTERNAL MEDICINE

## 2018-12-06 PROCEDURE — 1036F TOBACCO NON-USER: CPT | Performed by: INTERNAL MEDICINE

## 2018-12-06 PROCEDURE — G8427 DOCREV CUR MEDS BY ELIG CLIN: HCPCS | Performed by: INTERNAL MEDICINE

## 2018-12-06 PROCEDURE — G8417 CALC BMI ABV UP PARAM F/U: HCPCS | Performed by: INTERNAL MEDICINE

## 2018-12-06 PROCEDURE — 99213 OFFICE O/P EST LOW 20 MIN: CPT | Performed by: INTERNAL MEDICINE

## 2018-12-06 PROCEDURE — 1101F PT FALLS ASSESS-DOCD LE1/YR: CPT | Performed by: INTERNAL MEDICINE

## 2018-12-06 PROCEDURE — 3017F COLORECTAL CA SCREEN DOC REV: CPT | Performed by: INTERNAL MEDICINE

## 2018-12-06 PROCEDURE — 4040F PNEUMOC VAC/ADMIN/RCVD: CPT | Performed by: INTERNAL MEDICINE

## 2019-04-03 RX ORDER — SPIRONOLACTONE 25 MG/1
25 TABLET ORAL DAILY
Qty: 90 TABLET | Refills: 1 | Status: CANCELLED | OUTPATIENT
Start: 2019-04-03

## 2019-04-04 RX ORDER — SPIRONOLACTONE 25 MG/1
25 TABLET ORAL DAILY
Qty: 90 TABLET | Refills: 1 | Status: SHIPPED | OUTPATIENT
Start: 2019-04-04 | End: 2020-06-29 | Stop reason: SDUPTHER

## 2019-04-05 RX ORDER — SPIRONOLACTONE 25 MG/1
25 TABLET ORAL DAILY
Qty: 90 TABLET | Refills: 1 | Status: SHIPPED | OUTPATIENT
Start: 2019-04-05 | End: 2019-09-28 | Stop reason: SDUPTHER

## 2019-04-05 NOTE — TELEPHONE ENCOUNTER
Patient called today. He said that Spironolactone went to [facility] instead of [address] like he had asked. He would like a new prescription sent to the pharmacy he asked it to go to. Prescription is pending for signature.

## 2019-05-06 LAB — PROSTATE SPECIFIC ANTIGEN: NORMAL NG/ML

## 2019-05-13 ENCOUNTER — OFFICE VISIT (OUTPATIENT)
Dept: UROLOGY | Age: 69
End: 2019-05-13
Payer: MEDICARE

## 2019-05-13 VITALS
SYSTOLIC BLOOD PRESSURE: 124 MMHG | WEIGHT: 255 LBS | HEIGHT: 71 IN | DIASTOLIC BLOOD PRESSURE: 78 MMHG | BODY MASS INDEX: 35.7 KG/M2

## 2019-05-13 DIAGNOSIS — C61 PROSTATE CANCER (HCC): Primary | ICD-10-CM

## 2019-05-13 LAB
BILIRUBIN URINE: NEGATIVE
BLOOD URINE, POC: NEGATIVE
CHARACTER, URINE: CLEAR
COLOR, URINE: YELLOW
GLUCOSE URINE: NEGATIVE MG/DL
KETONES, URINE: NEGATIVE
LEUKOCYTE CLUMPS, URINE: NEGATIVE
NITRITE, URINE: NEGATIVE
PH, URINE: 5.5 (ref 5–9)
PROTEIN, URINE: NEGATIVE MG/DL
SPECIFIC GRAVITY, URINE: 1.01 (ref 1–1.03)
UROBILINOGEN, URINE: 0.2 EU/DL (ref 0–1)

## 2019-05-13 PROCEDURE — 99213 OFFICE O/P EST LOW 20 MIN: CPT | Performed by: NURSE PRACTITIONER

## 2019-05-13 PROCEDURE — G8417 CALC BMI ABV UP PARAM F/U: HCPCS | Performed by: NURSE PRACTITIONER

## 2019-05-13 PROCEDURE — 1123F ACP DISCUSS/DSCN MKR DOCD: CPT | Performed by: NURSE PRACTITIONER

## 2019-05-13 PROCEDURE — 4040F PNEUMOC VAC/ADMIN/RCVD: CPT | Performed by: NURSE PRACTITIONER

## 2019-05-13 PROCEDURE — 3017F COLORECTAL CA SCREEN DOC REV: CPT | Performed by: NURSE PRACTITIONER

## 2019-05-13 PROCEDURE — 81003 URINALYSIS AUTO W/O SCOPE: CPT | Performed by: NURSE PRACTITIONER

## 2019-05-13 PROCEDURE — G8427 DOCREV CUR MEDS BY ELIG CLIN: HCPCS | Performed by: NURSE PRACTITIONER

## 2019-05-13 PROCEDURE — 1036F TOBACCO NON-USER: CPT | Performed by: NURSE PRACTITIONER

## 2019-05-13 NOTE — PROGRESS NOTES
[address] [place]de  Dept: [phone]  Loc: [phone]  Visit Date: 5/13/2019      HPI:     Blanca Snow f/u today for Prostate Cancer. He underwent RALRP with Dr. Wilfred Celestin on 03/24/2017. Surgical Pathology revealed invasive adenocarcinoma of prostate, kendall's combined score 3+4=7, grade group 2. No evidence of extraprostatic extension, margins clear, no seminal vesical involvement, bilateral lymph node dissection negative for malignancy. PSA remains undetectable at <0.03. He continues to do well. Stress incontinence remains, wears pads, goes through about 4 daily. Denies any erections, has a sensation, has a pump, however has not used this yet. Not interested in Trimix at this time. He comes in today by himself. Hx is obtained from the patient and the medical record. Current Outpatient Medications   Medication Sig Dispense Refill    spironolactone (ALDACTONE) 25 MG tablet Take 1 tablet by mouth daily 90 tablet 1    spironolactone (ALDACTONE) 25 MG tablet Take 1 tablet by mouth daily 90 tablet 1    Omega-3 Fatty Acids (FISH OIL) 1000 MG CAPS Take 3,000 mg by mouth daily      aspirin 81 MG tablet Take 81 mg by mouth daily      hydrocortisone 2.5 % cream       Multiple Vitamins-Minerals (MULTIVITAMIN PO) Take by mouth daily      niacin 500 MG extended release capsule Take 500 mg by mouth nightly      lisinopril-hydrochlorothiazide (PRINZIDE;ZESTORETIC) 20-25 MG per tablet Take 1 tablet by mouth 2 times daily       carvedilol (COREG) 12.5 MG tablet Take 12.5 mg by mouth 2 times daily (with meals)      atorvastatin (LIPITOR) 40 MG tablet Take 40 mg by mouth nightly        No current facility-administered medications for this visit. Past Medical History  Froilan Ugarte  has a past medical history of Appendicitis, Hyperlipidemia, and Hypertension.     Past Surgical History  The patient  has a past surgical history that includes Appendectomy; Prostatectomy (03/24/2017); and other surgical history (03/24/2017). Family History  This patient's family history includes Breast Cancer in his mother; Cancer in his father and mother; High Blood Pressure in his mother. Social History  Lizbeth Drisocll  reports that he has never smoked. He has never used smokeless tobacco. He reports that he drinks alcohol. He reports that he does not use drugs. Subjective:     Review of Systems  No problems with ears, nose or throat. No problems with eyes. No chest pain, shortness of breath, abdominal pain, extremity pain or weakness, and no neurological deficits. No rashes.  symptoms per HPI. The remainder of the review of symptoms is negative. Objective:     PE:   Vitals:    05/13/19 0948   BP: 124/78   Weight: 255 lb (115.7 kg)   Height: 5' 11\" (1.803 m)       Constitutional: Alert and oriented times 3, no acute distress and cooperative to examination with appropriate mood and affect. HENT:   Head:        Normocephalic and atraumatic. Mouth/Throat:         Mucous membranes are normal.   Eyes:         EOM are normal. No scleral icterus. PERRLA. Neck:        Supple, symmetrical, trachea midline  Pulmonary/Chest:      Chest symmetric with normal A/P diameter,  Normal respiratory rate and rhthym. No use of accessory muscles. Abdominal:         Soft. No tenderness. Bowel sounds present. Musculoskeletal:         Normal range of motion. No edema or tenderness of lower extremities. Extremities: No cyanosis, clubbing, or edema present. Neurological:        Alert and oriented. No cranial nerve deficit. [place] Psychiatric:        Normal mood and affect.       Labs   Urine dip demonstrates   Results for POC orders placed in visit on 05/13/19   POCT Urinalysis No Micro (Auto)   Result Value Ref Range    Glucose, Ur Negative NEGATIVE mg/dl    Bilirubin Urine Negative     Ketones, Urine Negative NEGATIVE    Specific Gravity, Urine 1.015 1.002 - 1.03    Blood, UA POC Negative NEGATIVE    pH, Urine 5.50 5.0 - 9.0    Protein, Urine Negative NEGATIVE mg/dl    Urobilinogen, Urine 0.20 0.0 - 1.0 eu/dl    Nitrite, Urine Negative NEGATIVE    Leukocyte Clumps, Urine Negative NEGATIVE    Color, Urine Yellow YELLOW-STR    Character, Urine Clear [email or web address] [email or web address]       Patients recent PSA values are as follows  Lab Results   Component Value Date    PSA  05/06/2019      Comment:      <0.03    PSA  11/05/2018      Comment:      <0.03    PSA  05/03/2018      Comment:      <0.03        Recent BUN/Creatinine:  Lab Results   Component Value Date    BUN 25 12/03/2018    CREATININE 1.0 12/03/2018       Surgical Pathology  FINAL DIAGNOSIS:  A - Lymph nodes, right pelvic:   Five benign lymph nodes. B - Lymph nodes, left pelvic:   Nine benign lymph nodes. C - Prostate, radical prostatectomy:   Invasive adenocarcinoma of prostate.   [place]'s combined score: 7 (3+4), Grade Group 2.   Approximately 15% of prostate tissue involved by tumor.   Tumor involves both right and left lobes.   No evidence of extraprostatic extension.   Margins of resection are clear.   No evidence of seminal vesicle involvement.   Perineural invasion is present.   See microscopic description for additional prognostic indicators. Assessment & Plan:     Prostate Cancer- pT2c, pN0  Stress Incontinence  Erectile Dysfunction    [place] f/u today for his prostate cancer. He underwent RALRP with Dr. Shyanne Salas on 03/24/2017. Surgical pathology revealed Khloe's score 3+4=7, grade group 2. No adverse features. PSA remains undetectable, plan to follow with PSA in 1 year    Erectile Dysfunction- some response with Sildenafil, not interested in Trimix, has penile pump which he has not tried yet    Stress Incontinence- Discussed Male Urethral Sling, he will call office if interested. Would perform Cysto with Og Rotunda prior to sling placement.      Return in about 1 year (around 5/13/2020) for Prostate Cancer.     JONE Horton  Urology

## 2019-09-30 RX ORDER — SPIRONOLACTONE 25 MG/1
TABLET ORAL
Qty: 90 TABLET | Refills: 1 | Status: SHIPPED | OUTPATIENT
Start: 2019-09-30 | End: 2020-03-23

## 2019-12-02 LAB
BUN BLDV-MCNC: 28 MG/DL
CALCIUM SERPL-MCNC: 9.3 MG/DL
CHLORIDE BLD-SCNC: 102 MMOL/L
CO2: 25 MMOL/L
CREAT SERPL-MCNC: 1.2 MG/DL
CREATININE URINE: 198.9 MG/DL
GFR CALCULATED: >60
GLUCOSE BLD-MCNC: 131 MG/DL
MICROALBUMIN/CREAT 24H UR: <1.2 MG/G{CREAT}
POTASSIUM SERPL-SCNC: 4.1 MMOL/L
SODIUM BLD-SCNC: 141 MMOL/L

## 2019-12-05 ENCOUNTER — OFFICE VISIT (OUTPATIENT)
Dept: NEPHROLOGY | Age: 69
End: 2019-12-05
Payer: MEDICARE

## 2019-12-05 VITALS
DIASTOLIC BLOOD PRESSURE: 70 MMHG | HEART RATE: 72 BPM | SYSTOLIC BLOOD PRESSURE: 124 MMHG | BODY MASS INDEX: 34.87 KG/M2 | OXYGEN SATURATION: 98 % | WEIGHT: 250 LBS

## 2019-12-05 DIAGNOSIS — N18.2 CKD (CHRONIC KIDNEY DISEASE), STAGE II: Primary | ICD-10-CM

## 2019-12-05 DIAGNOSIS — N28.1 RENAL CYST, LEFT: ICD-10-CM

## 2019-12-05 PROCEDURE — 3017F COLORECTAL CA SCREEN DOC REV: CPT | Performed by: INTERNAL MEDICINE

## 2019-12-05 PROCEDURE — 1123F ACP DISCUSS/DSCN MKR DOCD: CPT | Performed by: INTERNAL MEDICINE

## 2019-12-05 PROCEDURE — G8427 DOCREV CUR MEDS BY ELIG CLIN: HCPCS | Performed by: INTERNAL MEDICINE

## 2019-12-05 PROCEDURE — G8417 CALC BMI ABV UP PARAM F/U: HCPCS | Performed by: INTERNAL MEDICINE

## 2019-12-05 PROCEDURE — 1036F TOBACCO NON-USER: CPT | Performed by: INTERNAL MEDICINE

## 2019-12-05 PROCEDURE — G8484 FLU IMMUNIZE NO ADMIN: HCPCS | Performed by: INTERNAL MEDICINE

## 2019-12-05 PROCEDURE — 99213 OFFICE O/P EST LOW 20 MIN: CPT | Performed by: INTERNAL MEDICINE

## 2019-12-05 PROCEDURE — 4040F PNEUMOC VAC/ADMIN/RCVD: CPT | Performed by: INTERNAL MEDICINE

## 2019-12-17 ENCOUNTER — TELEPHONE (OUTPATIENT)
Dept: NEPHROLOGY | Age: 69
End: 2019-12-17

## 2019-12-18 ENCOUNTER — HOSPITAL ENCOUNTER (OUTPATIENT)
Dept: CT IMAGING | Age: 69
Discharge: HOME | End: 2019-12-18
Payer: MEDICARE

## 2019-12-18 DIAGNOSIS — N28.1 RENAL CYST, LEFT: Primary | ICD-10-CM

## 2019-12-18 DIAGNOSIS — N28.1 RENAL CYST, LEFT: ICD-10-CM

## 2019-12-18 PROCEDURE — 74170 CT ABD WO CNTRST FLWD CNTRST: CPT

## 2019-12-18 PROCEDURE — 6360000004 HC RX CONTRAST MEDICATION: Performed by: INTERNAL MEDICINE

## 2019-12-18 RX ADMIN — IOPAMIDOL 85 ML: 755 INJECTION, SOLUTION INTRAVENOUS at 12:37

## 2019-12-19 ENCOUNTER — TELEPHONE (OUTPATIENT)
Dept: NEPHROLOGY | Age: 69
End: 2019-12-19

## 2020-01-21 LAB
ALBUMIN SERPL-MCNC: 4.3 G/DL
ALP BLD-CCNC: 66 U/L
ALT SERPL-CCNC: 37 U/L
ANION GAP SERPL CALCULATED.3IONS-SCNC: NORMAL MMOL/L
ANTIBODY: NEGATIVE
AST SERPL-CCNC: 25 U/L
AVERAGE GLUCOSE: 128
BILIRUB SERPL-MCNC: 0.4 MG/DL (ref 0.1–1.4)
BUN BLDV-MCNC: 34 MG/DL
CALCIUM SERPL-MCNC: 10.7 MG/DL
CHLORIDE BLD-SCNC: 101 MMOL/L
CHOLESTEROL, TOTAL: 119 MG/DL
CHOLESTEROL/HDL RATIO: ABNORMAL
CO2: 25 MMOL/L
CREAT SERPL-MCNC: 1.4 MG/DL
GFR CALCULATED: 54
GLUCOSE BLD-MCNC: 124 MG/DL
HBA1C MFR BLD: 6.1 %
HDLC SERPL-MCNC: 32 MG/DL (ref 35–70)
LDL CHOLESTEROL CALCULATED: 37 MG/DL (ref 0–160)
POTASSIUM SERPL-SCNC: 5.3 MMOL/L
SODIUM BLD-SCNC: 139 MMOL/L
TOTAL PROTEIN: 7.1
TRIGL SERPL-MCNC: 252 MG/DL
VLDLC SERPL CALC-MCNC: 50 MG/DL

## 2020-03-23 RX ORDER — SPIRONOLACTONE 25 MG/1
TABLET ORAL
Qty: 90 TABLET | Refills: 1 | Status: SHIPPED | OUTPATIENT
Start: 2020-03-23 | End: 2020-03-23 | Stop reason: SDUPTHER

## 2020-03-24 RX ORDER — SPIRONOLACTONE 25 MG/1
25 TABLET ORAL DAILY
Qty: 90 TABLET | Refills: 0 | Status: SHIPPED | OUTPATIENT
Start: 2020-03-24 | End: 2020-06-15 | Stop reason: SDUPTHER

## 2020-06-04 LAB — PROSTATE SPECIFIC ANTIGEN: NORMAL

## 2020-06-15 ENCOUNTER — VIRTUAL VISIT (OUTPATIENT)
Dept: UROLOGY | Age: 70
End: 2020-06-15
Payer: MEDICARE

## 2020-06-15 PROCEDURE — 1123F ACP DISCUSS/DSCN MKR DOCD: CPT | Performed by: UROLOGY

## 2020-06-15 PROCEDURE — 3017F COLORECTAL CA SCREEN DOC REV: CPT | Performed by: UROLOGY

## 2020-06-15 PROCEDURE — G8427 DOCREV CUR MEDS BY ELIG CLIN: HCPCS | Performed by: UROLOGY

## 2020-06-15 PROCEDURE — 99213 OFFICE O/P EST LOW 20 MIN: CPT | Performed by: UROLOGY

## 2020-06-15 PROCEDURE — 4040F PNEUMOC VAC/ADMIN/RCVD: CPT | Performed by: UROLOGY

## 2020-06-15 ASSESSMENT — ENCOUNTER SYMPTOMS
BACK PAIN: 0
NAUSEA: 0
EYE ITCHING: 0
DIARRHEA: 0
EYE PAIN: 0
SHORTNESS OF BREATH: 0
COLOR CHANGE: 0
CHEST TIGHTNESS: 0

## 2020-06-15 NOTE — PROGRESS NOTES
Patient:  Jess Pelayo    Review of Systems    Review of Systems   Constitutional: Negative for chills and fever. Eyes: Negative for pain and itching. Respiratory: Negative for chest tightness and shortness of breath. Cardiovascular: Negative for chest pain and leg swelling. Gastrointestinal: Negative for diarrhea and nausea. Endocrine: Negative for cold intolerance and heat intolerance. Genitourinary: Negative for difficulty urinating and frequency. Musculoskeletal: Negative for back pain and joint swelling. Skin: Negative for color change and rash. Allergic/Immunologic: Negative for environmental allergies and food allergies. Neurological: Negative for dizziness and headaches. Hematological: Does not bruise/bleed easily.

## 2020-06-30 RX ORDER — SPIRONOLACTONE 25 MG/1
12.5 TABLET ORAL DAILY
Qty: 90 TABLET | Refills: 1 | Status: SHIPPED | OUTPATIENT
Start: 2020-06-30 | End: 2021-06-23 | Stop reason: SDUPTHER

## 2020-07-06 NOTE — TELEPHONE ENCOUNTER
Pt called and wanted to know why his spironolactone was decreased to half. I explained to the pt that his potassium was a little bit elevated. He understood. He will go to [facility] next week to have blood work done.

## 2020-07-20 LAB
BUN BLDV-MCNC: 34 MG/DL
CALCIUM SERPL-MCNC: 9.5 MG/DL
CHLORIDE BLD-SCNC: 103 MMOL/L
CO2: 24 MMOL/L
CREAT SERPL-MCNC: 1.6 MG/DL
GFR CALCULATED: 46
GLUCOSE BLD-MCNC: 144 MG/DL
POTASSIUM SERPL-SCNC: 4.5 MMOL/L
SODIUM BLD-SCNC: 139 MMOL/L

## 2020-07-21 ENCOUNTER — TELEPHONE (OUTPATIENT)
Dept: NEPHROLOGY | Age: 70
End: 2020-07-21

## 2020-07-21 NOTE — TELEPHONE ENCOUNTER
Let patient know kidney function is a little weaker, advise to increase his water intake and will repeat a bmp in 2 weeks.  If no improvement I may need to adjust his medications.

## 2020-11-23 LAB
BUN BLDV-MCNC: 26 MG/DL
CALCIUM SERPL-MCNC: 9.6 MG/DL
CHLORIDE BLD-SCNC: 100 MMOL/L
CO2: 27 MMOL/L
CREAT SERPL-MCNC: 1.3 MG/DL
GFR CALCULATED: 58
GLUCOSE BLD-MCNC: 120 MG/DL
POTASSIUM SERPL-SCNC: 4.5 MMOL/L
SODIUM BLD-SCNC: 138 MMOL/L

## 2020-12-03 ENCOUNTER — OFFICE VISIT (OUTPATIENT)
Dept: NEPHROLOGY | Age: 70
End: 2020-12-03
Payer: MEDICARE

## 2020-12-03 VITALS
HEART RATE: 82 BPM | SYSTOLIC BLOOD PRESSURE: 148 MMHG | BODY MASS INDEX: 32.92 KG/M2 | DIASTOLIC BLOOD PRESSURE: 72 MMHG | OXYGEN SATURATION: 97 % | WEIGHT: 236 LBS

## 2020-12-03 PROCEDURE — G8427 DOCREV CUR MEDS BY ELIG CLIN: HCPCS | Performed by: INTERNAL MEDICINE

## 2020-12-03 PROCEDURE — 1036F TOBACCO NON-USER: CPT | Performed by: INTERNAL MEDICINE

## 2020-12-03 PROCEDURE — G8417 CALC BMI ABV UP PARAM F/U: HCPCS | Performed by: INTERNAL MEDICINE

## 2020-12-03 PROCEDURE — 1123F ACP DISCUSS/DSCN MKR DOCD: CPT | Performed by: INTERNAL MEDICINE

## 2020-12-03 PROCEDURE — 3017F COLORECTAL CA SCREEN DOC REV: CPT | Performed by: INTERNAL MEDICINE

## 2020-12-03 PROCEDURE — 99213 OFFICE O/P EST LOW 20 MIN: CPT | Performed by: INTERNAL MEDICINE

## 2020-12-03 PROCEDURE — G8484 FLU IMMUNIZE NO ADMIN: HCPCS | Performed by: INTERNAL MEDICINE

## 2020-12-03 PROCEDURE — 4040F PNEUMOC VAC/ADMIN/RCVD: CPT | Performed by: INTERNAL MEDICINE

## 2020-12-03 NOTE — PROGRESS NOTES
[facility]    [facility], [address]   [address] [facility], [address]  [phone]  Progress Note  12/3/2020 9:30 AM      Pt Name:    Babak Chowdhury  YOB: 1950  Primary Care Physician:  Flakito Levy MD     Chief Complaint:   Chief Complaint   Patient presents with    Chronic Kidney Disease     Stage IIIa        History of Present Illness: This is a follow-up visit for CKD III. Overall doing well. Creat was up in July, states he wasn't drinking much water. BP controlled at home. No flank pain. Hx of prostate cancer s/p prostatectomy 2017 , stress incontinence, HTN. He is a  for [employer]. Pertinent items are noted in HPI. Past History:  Past Medical History:   Diagnosis Date    Appendicitis 1956    Hyperlipidemia     Hypertension      Past Surgical History:   Procedure Laterality Date    APPENDECTOMY      COLONOSCOPY      OTHER SURGICAL HISTORY  03/24/2017    Lysis of adhesions    PROSTATECTOMY  03/24/2017    Robotic, bilateral pelvic lymphadenectomy        VITALS:  BP (!) 148/72 (Site: Left Upper Arm, Position: Sitting, Cuff Size: Large Adult)   Pulse 82   Wt 236 lb (107 kg)   SpO2 97%   BMI 32.92 kg/m²   Wt Readings from Last 3 Encounters:   12/03/20 236 lb (107 kg)   12/05/19 250 lb (113.4 kg)   05/13/19 255 lb (115.7 kg)     Body mass index is 32.92 kg/m².      General Appearance: alert and cooperative with exam, appears comfortable, no distress  HEENT: EOMI, moist oral mucus membranes  Neck: No jugular venous distention,  Lungs: Air entry B/L, no crackles or rales, no use of accessory muscles  Heart: S1, S2 heard, no rub  GI: soft, non-tender, no guarding, no flank pain  Extremities: trace LE edema, no cyanosis  Skin: warm, dry  Neurologic: no tremor, no asterixis, no focal neurologic deficits     Medications:  Current Outpatient Medications   Medication Sig Dispense Refill    spironolactone (ALDACTONE) 25 MG tablet Take 0.5 tablets by mouth daily 90 tablet 1    aspirin 81 MG tablet Take 81 mg by mouth daily      hydrocortisone 2.5 % cream       Multiple Vitamins-Minerals (MULTIVITAMIN PO) Take by mouth daily      niacin 500 MG extended release capsule Take 500 mg by mouth nightly      lisinopril-hydrochlorothiazide (PRINZIDE;ZESTORETIC) 20-25 MG per tablet Take 1 tablet by mouth 2 times daily       carvedilol (COREG) 12.5 MG tablet Take 12.5 mg by mouth 2 times daily (with meals)      atorvastatin (LIPITOR) 40 MG tablet Take 40 mg by mouth nightly        No current facility-administered medications for this visit.          Laboratory & Diagnostics:  CBC:   Lab Results   Component Value Date    WBC 10.3 04/03/2017    HGB 15.7 04/03/2017    HCT 45.9 04/03/2017    MCV 87.6 04/03/2017     04/03/2017     BMP:    Lab Results   Component Value Date     11/23/2020     07/20/2020     01/21/2020    K 4.5 11/23/2020    K 4.5 07/20/2020    K 5.3 01/21/2020     11/23/2020     07/20/2020     01/21/2020    CO2 27.0 11/23/2020    CO2 24.0 07/20/2020    CO2 25.0 01/21/2020    BUN 26 11/23/2020    BUN 34 07/20/2020    BUN 34 01/21/2020    CREATININE 1.3 11/23/2020    CREATININE 1.6 07/20/2020    CREATININE 1.4 01/21/2020    GLUCOSE 120 11/23/2020    GLUCOSE 144 07/20/2020    GLUCOSE 124 01/21/2020      Hepatic:   Lab Results   Component Value Date    AST 25 01/21/2020    AST 21 04/03/2017    AST 25 02/21/2017    ALT 37 01/21/2020    ALT 31 04/03/2017    ALT 30 02/21/2017    BILITOT 0.4 01/21/2020    BILITOT 0.7 04/03/2017    BILITOT 0.7 02/21/2017    ALKPHOS 66 01/21/2020    ALKPHOS 84 04/03/2017    ALKPHOS 69 02/21/2017     BNP: No results found for: BNP  Lipids:   Lab Results   Component Value Date    CHOL 119 01/21/2020    HDL 32 (A) 01/21/2020     INR:   Lab Results   Component Value Date    INR 1.01 03/24/2017     URINE:   Lab Results   Component Value Date    PROTUR Negative 05/13/2019     Lab Results   Component Value Date    NITRU Negative 05/13/2019    COLORU Yellow 05/13/2019    COLORU Yellow 12/12/2017    PHUR 5.0 12/12/2017    CLARITYU Clear 12/12/2017    SPECGRAV 1.020 02/28/2017    LEUKOCYTESUR negative 12/12/2017    UROBILINOGEN 0.20 05/13/2019    BILIRUBINUR Negative 05/13/2019    BLOODU Negative 05/13/2019    BLOODU Negative 12/12/2017    GLUCOSEU Negative 05/13/2019    KETUA Negative 05/13/2019      Microalbumen/Creatinine ratio:  No components found for: RUCREAT        Impression/Plan:   1. CKD IIIa due to senile and hypertensive nephrosclerosis, stable .had elevated creat over the summer which is improved. If worsens may need to adjust meds. On aldactone as well as ACE-I/thiazide. Goals of care include slowing rate of progression by controlling blood pressure and by avoiding nephrotoxins such as NSAIDs and IV contrast.  2. Hypertension -controlled  3. Left renal cyst - stable  4. Hx prostate CA s/p prostatectomy :Follows with urology  5. Hx hypercalcemia. Resolved off Tums. Check PTH next  visit        Bloodwork and medications were reviewed and plan of care discussed with the patient. Return to clinic in 1 year  or sooner if the need arises.       Judi Morejon DO  [facility]

## 2020-12-10 LAB — PROSTATE SPECIFIC ANTIGEN: NORMAL

## 2020-12-17 ENCOUNTER — OFFICE VISIT (OUTPATIENT)
Dept: UROLOGY | Age: 70
End: 2020-12-17
Payer: MEDICARE

## 2020-12-17 VITALS — WEIGHT: 259 LBS | HEIGHT: 71 IN | BODY MASS INDEX: 36.26 KG/M2 | TEMPERATURE: 96.3 F

## 2020-12-17 PROCEDURE — 1036F TOBACCO NON-USER: CPT | Performed by: UROLOGY

## 2020-12-17 PROCEDURE — 3017F COLORECTAL CA SCREEN DOC REV: CPT | Performed by: UROLOGY

## 2020-12-17 PROCEDURE — 1123F ACP DISCUSS/DSCN MKR DOCD: CPT | Performed by: UROLOGY

## 2020-12-17 PROCEDURE — 99214 OFFICE O/P EST MOD 30 MIN: CPT | Performed by: UROLOGY

## 2020-12-17 PROCEDURE — G8484 FLU IMMUNIZE NO ADMIN: HCPCS | Performed by: UROLOGY

## 2020-12-17 PROCEDURE — 4040F PNEUMOC VAC/ADMIN/RCVD: CPT | Performed by: UROLOGY

## 2020-12-17 PROCEDURE — G8427 DOCREV CUR MEDS BY ELIG CLIN: HCPCS | Performed by: UROLOGY

## 2020-12-17 PROCEDURE — G8417 CALC BMI ABV UP PARAM F/U: HCPCS | Performed by: UROLOGY

## 2020-12-17 NOTE — PROGRESS NOTES
Wayne Celaya MD   Urology Clinic Progress Note      Patient:  Steve Gracia  YOB: 1950  Date: 12/17/2020    HISTORY OF PRESENT ILLNESS:   The patient is a 72 y.o. male who presents today for follow-up for the following problem(s):      1. Prostate cancer ([address]    2. Stress incontinence (female) (male)    3. Erectile dysfunction after radical prostatectomy           Overall the problem(s) : unchanged. Associated Symptoms: No dysuria, gross hematuria. Pain Severity:  1/10    Summary of old records:   Steve Gracia f/u today for Prostate Cancer. He underwent RALRP with Dr. Ramesh Brink on 03/24/2017. Surgical Pathology revealed invasive adenocarcinoma of prostate, kendall's combined score 3+4=7, grade group 2. No evidence of extraprostatic extension, margins clear, no seminal vesical involvement, bilateral lymph node dissection negative for malignancy. PSA remains undetectable at <0.03. He continues to do well. Stress incontinence remains, wears pads, goes through about 4 daily. Denies any erections, has a sensation, has a pump, however has not used this yet. Not interested in Trimix at this time. He comes in today by himself. Hx is obtained from the patient and the medical record    Additional History: Onset 2017  Severity is described as mild-moderate. some POLLY  The course of symptoms over time is chronic. Alleviating factors: wears depends, wears a pad, 4x/day  Worsening factors: physical activity  Lower urinary tract symptoms:  moderate POLLY- wears 3-4 pads day, worse with activity    normal stream, no hematuria    Last several PSA's:  Lab Results   Component Value Date    PSA  12/10/2020      Comment:      <0.03    PSA  06/04/2020      Comment:      <0.03    PSA  05/06/2019      Comment:      <0.03       Last total testosterone:  No results found for: TESTOSTERONE    Urinalysis today:  No results found for this visit on 12/17/20.     Last BUN and creatinine:  Lab Results   Component

## 2021-06-09 LAB — PROSTATE SPECIFIC ANTIGEN: NORMAL

## 2021-06-17 ENCOUNTER — OFFICE VISIT (OUTPATIENT)
Dept: UROLOGY | Age: 71
End: 2021-06-17
Payer: MEDICARE

## 2021-06-17 VITALS — RESPIRATION RATE: 17 BRPM | WEIGHT: 260 LBS | HEIGHT: 71 IN | BODY MASS INDEX: 36.4 KG/M2

## 2021-06-17 DIAGNOSIS — C61 PROSTATE CANCER (HCC): Primary | ICD-10-CM

## 2021-06-17 DIAGNOSIS — N39.3 STRESS INCONTINENCE (FEMALE) (MALE): ICD-10-CM

## 2021-06-17 DIAGNOSIS — N52.31 ERECTILE DYSFUNCTION AFTER RADICAL PROSTATECTOMY: ICD-10-CM

## 2021-06-17 PROCEDURE — 99214 OFFICE O/P EST MOD 30 MIN: CPT | Performed by: UROLOGY

## 2021-06-17 PROCEDURE — G8427 DOCREV CUR MEDS BY ELIG CLIN: HCPCS | Performed by: UROLOGY

## 2021-06-17 PROCEDURE — 1123F ACP DISCUSS/DSCN MKR DOCD: CPT | Performed by: UROLOGY

## 2021-06-17 PROCEDURE — G8417 CALC BMI ABV UP PARAM F/U: HCPCS | Performed by: UROLOGY

## 2021-06-17 PROCEDURE — 3017F COLORECTAL CA SCREEN DOC REV: CPT | Performed by: UROLOGY

## 2021-06-17 PROCEDURE — 4040F PNEUMOC VAC/ADMIN/RCVD: CPT | Performed by: UROLOGY

## 2021-06-17 PROCEDURE — 1036F TOBACCO NON-USER: CPT | Performed by: UROLOGY

## 2021-06-17 RX ORDER — TADALAFIL 20 MG/1
20 TABLET ORAL DAILY PRN
Qty: 20 TABLET | Refills: 1 | Status: SHIPPED | OUTPATIENT
Start: 2021-06-17 | End: 2022-06-16

## 2021-06-17 NOTE — PROGRESS NOTES
Berry Parker MD   Urology Clinic Progress Note      Patient:  Saravanan Jay  YOB: 1950  Date: 6/17/2021    HISTORY OF PRESENT ILLNESS:   The patient is a 72 y.o. male who presents today for follow-up for the following problem(s):      1. Prostate cancer ([address]    2. Stress incontinence (female) (male)    3. Erectile dysfunction after radical prostatectomy           Overall the problem(s) : unchanged. Associated Symptoms: No dysuria, gross hematuria. Pain Severity:  1/10    Summary of old records:   Saravanan Jay f/u today for Prostate Cancer. He underwent RALRP with Dr. Akua Wilson on 03/24/2017. Surgical Pathology revealed invasive adenocarcinoma of prostate, kendall's combined score 3+4=7, grade group 2. No evidence of extraprostatic extension, margins clear, no seminal vesical involvement, bilateral lymph node dissection negative for malignancy. PSA remains undetectable at <0.03. He continues to do well. Stress incontinence remains, wears pads, goes through about 4 daily. Denies any erections, has a sensation, has a pump, however has not used this yet. Not interested in Trimix at this time. He comes in today by himself. Hx is obtained from the patient and the medical record    Additional History: Onset 2017 RALP  Severity is described as mild-moderate. some POLLY  Lower urinary tract symptoms:  moderate POLLY- wears 3-4 pads day, worse with activity    normal stream, no hematuria  IPPS 3   QOL 2    Last several PSA's:  Lab Results   Component Value Date    PSA  06/09/2021      Comment:      <0.03    PSA  12/10/2020      Comment:      <0.03    PSA  06/04/2020      Comment:      <0.03       Last total testosterone:  No results found for: TESTOSTERONE    Urinalysis today:  No results found for this visit on 06/17/21.     Last BUN and creatinine:  Lab Results   Component Value Date    BUN 26 11/23/2020     Lab Results   Component Value Date    CREATININE 1.3 11/23/2020       Imaging Reviewed during this Office Visit:   (results were independently reviewed by physician and radiology report verified)    PAST MEDICAL, FAMILY AND SOCIAL HISTORY UPDATE:  Past Medical History:   Diagnosis Date    Appendicitis 1956    Hyperlipidemia     Hypertension      Past Surgical History:   Procedure Laterality Date    APPENDECTOMY      COLONOSCOPY      OTHER SURGICAL HISTORY  03/24/2017    Lysis of adhesions    PROSTATECTOMY  03/24/2017    Robotic, bilateral pelvic lymphadenectomy     Family History   Problem Relation Age of Onset    Cancer Mother         breast    Breast Cancer Mother     High Blood Pressure Mother     Cancer Father         lung    Heart Disease Neg Hx     Diabetes Neg Hx     Stroke Neg Hx      Outpatient Medications Marked as Taking for the 6/17/21 encounter (Office Visit) with Michael Booth MD   Medication Sig Dispense Refill    tadalafil (CIALIS) 20 MG tablet Take 1 tablet by mouth daily as needed for Erectile Dysfunction Take 45 min prior 20 tablet 1    spironolactone (ALDACTONE) 25 MG tablet Take 0.5 tablets by mouth daily 90 tablet 1    aspirin 81 MG tablet Take 81 mg by mouth daily      Multiple Vitamins-Minerals (MULTIVITAMIN PO) Take by mouth daily      niacin 500 MG extended release capsule Take 500 mg by mouth nightly      lisinopril-hydrochlorothiazide (PRINZIDE;ZESTORETIC) 20-25 MG per tablet Take 1 tablet by mouth 2 times daily       carvedilol (COREG) 12.5 MG tablet Take 12.5 mg by mouth 2 times daily (with meals)      atorvastatin (LIPITOR) 40 MG tablet Take 40 mg by mouth nightly          Amoxicillin and Penicillins  Social History     Tobacco Use   Smoking Status Never Smoker   Smokeless Tobacco Never Used       Social History     Substance and Sexual Activity   Alcohol Use Yes    Comment: some       REVIEW OF SYSTEMS:  Constitutional: negative  Eyes: negative  Respiratory: negative  Cardiovascular: negative  Gastrointestinal: negative  Musculoskeletal: negative  Genitourinary: negative except for what is in HPI  Skin: negative   Neurological: negative  Hematological/Lymphatic: negative  Psychological: negative    Physical Exam:      Vitals:    06/17/21 1158   Resp: 17     Patient is a 72 y.o. male in no acute distress and alert and oriented to person, place and time. NAD, A/o      Assessment and Plan      1. Prostate cancer ([address]    2. Stress incontinence (female) (male)    3. Erectile dysfunction after radical prostatectomy           Plan:       Prostate cancer: PSA undetectable, continue q 6 months  POLLY: persistent; discussed  artificial urinary sphincter at length  Risks: I discussed all the risks, benefits, alternatives and possible complications or surgery as well as expectations and post-op recovery. He will think about it. Information provided. ED: discussed PDE-5 inhibitors; Tadalafil 20 mg PRN. Rx sent. Return in about 6 months (around 12/17/2021).

## 2021-06-24 RX ORDER — SPIRONOLACTONE 25 MG/1
12.5 TABLET ORAL DAILY
Qty: 90 TABLET | Refills: 1 | Status: SHIPPED | OUTPATIENT
Start: 2021-06-24 | End: 2022-10-31

## 2021-11-29 LAB
BASOPHILS ABSOLUTE: NORMAL
BASOPHILS RELATIVE PERCENT: NORMAL
BUN BLDV-MCNC: 24 MG/DL
CALCIUM SERPL-MCNC: 9.8 MG/DL
CHLORIDE BLD-SCNC: 105 MMOL/L
CO2: 25 MMOL/L
CREAT SERPL-MCNC: 1.3 MG/DL
EOSINOPHILS ABSOLUTE: NORMAL
EOSINOPHILS RELATIVE PERCENT: NORMAL
GFR CALCULATED: 55
GLUCOSE BLD-MCNC: 139 MG/DL
HCT VFR BLD CALC: 44.4 % (ref 41–53)
HEMOGLOBIN: 14.5 G/DL (ref 13.5–17.5)
LYMPHOCYTES ABSOLUTE: NORMAL
LYMPHOCYTES RELATIVE PERCENT: NORMAL
MCH RBC QN AUTO: NORMAL PG
MCHC RBC AUTO-ENTMCNC: NORMAL G/DL
MCV RBC AUTO: NORMAL FL
MONOCYTES ABSOLUTE: NORMAL
MONOCYTES RELATIVE PERCENT: NORMAL
NEUTROPHILS ABSOLUTE: NORMAL
NEUTROPHILS RELATIVE PERCENT: NORMAL
PLATELET # BLD: 197 K/ΜL
PMV BLD AUTO: NORMAL FL
POTASSIUM SERPL-SCNC: 4.2 MMOL/L
PTH INTACT: 48
RBC # BLD: 4.78 10^6/ΜL
SODIUM BLD-SCNC: 141 MMOL/L
WBC # BLD: 6.7 10^3/ML

## 2021-12-02 ENCOUNTER — OFFICE VISIT (OUTPATIENT)
Dept: NEPHROLOGY | Age: 71
End: 2021-12-02
Payer: MEDICARE

## 2021-12-02 VITALS
WEIGHT: 216 LBS | DIASTOLIC BLOOD PRESSURE: 60 MMHG | HEART RATE: 162 BPM | SYSTOLIC BLOOD PRESSURE: 110 MMHG | BODY MASS INDEX: 30.13 KG/M2 | OXYGEN SATURATION: 98 %

## 2021-12-02 DIAGNOSIS — N18.31 STAGE 3A CHRONIC KIDNEY DISEASE (HCC): Primary | ICD-10-CM

## 2021-12-02 PROCEDURE — G8417 CALC BMI ABV UP PARAM F/U: HCPCS | Performed by: INTERNAL MEDICINE

## 2021-12-02 PROCEDURE — 1036F TOBACCO NON-USER: CPT | Performed by: INTERNAL MEDICINE

## 2021-12-02 PROCEDURE — G8484 FLU IMMUNIZE NO ADMIN: HCPCS | Performed by: INTERNAL MEDICINE

## 2021-12-02 PROCEDURE — 3017F COLORECTAL CA SCREEN DOC REV: CPT | Performed by: INTERNAL MEDICINE

## 2021-12-02 PROCEDURE — 99213 OFFICE O/P EST LOW 20 MIN: CPT | Performed by: INTERNAL MEDICINE

## 2021-12-02 PROCEDURE — 1123F ACP DISCUSS/DSCN MKR DOCD: CPT | Performed by: INTERNAL MEDICINE

## 2021-12-02 PROCEDURE — G8427 DOCREV CUR MEDS BY ELIG CLIN: HCPCS | Performed by: INTERNAL MEDICINE

## 2021-12-02 PROCEDURE — 4040F PNEUMOC VAC/ADMIN/RCVD: CPT | Performed by: INTERNAL MEDICINE

## 2021-12-02 RX ORDER — AMIODARONE HYDROCHLORIDE 200 MG/1
TABLET ORAL
COMMUNITY
Start: 2021-11-04

## 2021-12-02 NOTE — PROGRESS NOTES
Heart rate has been that high. He is currently out of amiodorone. Has an appt with Cardiologist next week.

## 2021-12-02 NOTE — PROGRESS NOTES
edema, no cyanosis  Skin: warm, dry  Neurologic: no tremor, no asterixis, no focal neurologic deficits     Medications:  Current Outpatient Medications   Medication Sig Dispense Refill    amiodarone (CORDARONE) 200 MG tablet       spironolactone (ALDACTONE) 25 MG tablet Take 0.5 tablets by mouth daily (Patient taking differently: Take 12.5 mg by mouth daily Every other day) 90 tablet 1    aspirin 81 MG tablet Take 81 mg by mouth daily      Multiple Vitamins-Minerals (MULTIVITAMIN PO) Take by mouth daily      niacin 500 MG extended release capsule Take 500 mg by mouth nightly      lisinopril-hydrochlorothiazide (PRINZIDE;ZESTORETIC) 20-25 MG per tablet Take 1 tablet by mouth 2 times daily       carvedilol (COREG) 12.5 MG tablet Take 25 mg by mouth 2 times daily (with meals)       atorvastatin (LIPITOR) 40 MG tablet Take 40 mg by mouth nightly       tadalafil (CIALIS) 20 MG tablet Take 1 tablet by mouth daily as needed for Erectile Dysfunction Take 45 min prior 20 tablet 1     No current facility-administered medications for this visit.         Laboratory & Diagnostics:  CBC:   Lab Results   Component Value Date    WBC 6.7 11/29/2021    HGB 14.5 11/29/2021    HCT 44.4 11/29/2021    MCV 87.6 04/03/2017     11/29/2021     BMP:    Lab Results   Component Value Date     11/29/2021     11/23/2020     07/20/2020    K 4.2 11/29/2021    K 4.5 11/23/2020    K 4.5 07/20/2020     11/29/2021     11/23/2020     07/20/2020    CO2 25.0 11/29/2021    CO2 27.0 11/23/2020    CO2 24.0 07/20/2020    BUN 24 11/29/2021    BUN 26 11/23/2020    BUN 34 07/20/2020    CREATININE 1.3 11/29/2021    CREATININE 1.3 11/23/2020    CREATININE 1.6 07/20/2020    GLUCOSE 139 11/29/2021    GLUCOSE 120 11/23/2020    GLUCOSE 144 07/20/2020      Hepatic:   Lab Results   Component Value Date    AST 25 01/21/2020    AST 21 04/03/2017    AST 25 02/21/2017    ALT 37 01/21/2020    ALT 31 04/03/2017    ALT 30 02/21/2017    BILITOT 0.4 01/21/2020    BILITOT 0.7 04/03/2017    BILITOT 0.7 02/21/2017    ALKPHOS 66 01/21/2020    ALKPHOS 84 04/03/2017    ALKPHOS 69 02/21/2017     BNP: No results found for: BNP  Lipids:   Lab Results   Component Value Date    CHOL 119 01/21/2020    HDL 32 (A) 01/21/2020     INR:   Lab Results   Component Value Date    INR 1.01 03/24/2017     URINE:   Lab Results   Component Value Date    PROTUR Negative 05/13/2019     Lab Results   Component Value Date    NITRU Negative 05/13/2019    COLORU Yellow 05/13/2019    COLORU Yellow 12/12/2017    PHUR 5.0 12/12/2017    CLARITYU Clear 12/12/2017    SPECGRAV 1.020 02/28/2017    LEUKOCYTESUR negative 12/12/2017    UROBILINOGEN 0.20 05/13/2019    BILIRUBINUR Negative 05/13/2019    BLOODU Negative 05/13/2019    BLOODU Negative 12/12/2017    GLUCOSEU Negative 05/13/2019    KETUA Negative 05/13/2019      Microalbumen/Creatinine ratio:  No components found for: RUCREAT        Impression/Plan:   1. CKD IIIa due to senile and hypertensive nephrosclerosis, stable  Goals of care include slowing rate of progression by controlling blood pressure and by avoiding nephrotoxins such as NSAIDs and IV contrast.  2. Hypertension -controlled  3. Tachycardia:  HR in 160s here. Asymptomatic. I instructed pt to go to ED. He refused. I advised to call his cardiologist ASAP and see if they can get him in today. 4. Left renal cyst   5. Hx prostate CA s/p prostatectomy :Follows with urology  6. Hx hypercalcemia. Resolved off Tums. PTH ok        Bloodwork and medications were reviewed and plan of care discussed with the patient. Return to clinic in 1 year  or sooner if the need arises.       Royce Burciaga,   [facility]

## 2021-12-16 ENCOUNTER — OFFICE VISIT (OUTPATIENT)
Dept: UROLOGY | Age: 71
End: 2021-12-16
Payer: MEDICARE

## 2021-12-16 VITALS — WEIGHT: 255 LBS | HEIGHT: 71 IN | BODY MASS INDEX: 35.7 KG/M2

## 2021-12-16 DIAGNOSIS — C61 PROSTATE CANCER (HCC): Primary | ICD-10-CM

## 2021-12-16 DIAGNOSIS — N52.31 ERECTILE DYSFUNCTION AFTER RADICAL PROSTATECTOMY: ICD-10-CM

## 2021-12-16 DIAGNOSIS — N39.3 STRESS INCONTINENCE (FEMALE) (MALE): ICD-10-CM

## 2021-12-16 PROCEDURE — 99214 OFFICE O/P EST MOD 30 MIN: CPT | Performed by: UROLOGY

## 2021-12-16 PROCEDURE — 1123F ACP DISCUSS/DSCN MKR DOCD: CPT | Performed by: UROLOGY

## 2021-12-16 PROCEDURE — 1036F TOBACCO NON-USER: CPT | Performed by: UROLOGY

## 2021-12-16 PROCEDURE — 3017F COLORECTAL CA SCREEN DOC REV: CPT | Performed by: UROLOGY

## 2021-12-16 PROCEDURE — G8417 CALC BMI ABV UP PARAM F/U: HCPCS | Performed by: UROLOGY

## 2021-12-16 PROCEDURE — 4040F PNEUMOC VAC/ADMIN/RCVD: CPT | Performed by: UROLOGY

## 2021-12-16 PROCEDURE — G8427 DOCREV CUR MEDS BY ELIG CLIN: HCPCS | Performed by: UROLOGY

## 2021-12-16 PROCEDURE — G8484 FLU IMMUNIZE NO ADMIN: HCPCS | Performed by: UROLOGY

## 2021-12-16 NOTE — PROGRESS NOTES
Malathi Briseno MD   Urology Clinic Progress Note      Patient:  Garth Urban  YOB: 1950  Date: 12/16/2021    HISTORY OF PRESENT ILLNESS:   The patient is a 80 y.o. male who presents today for follow-up for the following problem(s):      1. Prostate cancer ([address]    2. Stress incontinence (female) (male)    3. Erectile dysfunction after radical prostatectomy           Overall the problem(s) : unchanged. Associated Symptoms: No dysuria, gross hematuria. Pain Severity:  1/10    Summary of old records:   Garth Urban f/u today for Prostate Cancer. He underwent RALRP with Dr. Peterson Salinas on 03/24/2017. Surgical Pathology revealed invasive adenocarcinoma of prostate, kendall's combined score 3+4=7, grade group 2. No evidence of extraprostatic extension, margins clear, no seminal vesical involvement, bilateral lymph node dissection negative for malignancy. PSA remains undetectable at <0.03. He continues to do well. Stress incontinence remains, wears pads, goes through about 4 daily. Denies any erections, has a sensation, has a pump, however has not used this yet. Not interested in Trimix at this time. He comes in today by himself. Hx is obtained from the patient and the medical record    Additional History: Onset 2017 RALP  Severity is described as mild-moderate. some POLLY  Lower urinary tract symptoms:  Stable non-bptherome moderate POLLY- wears 3-4 pads day, worse with activity    normal stream, no hematuria  Recent covid  IPPS 6   QOL 1  No UTIs  PSA 12/2021 <0.03    Last several PSA's:  Lab Results   Component Value Date    PSA  06/09/2021      Comment:      <0.03    PSA  12/10/2020      Comment:      <0.03    PSA  06/04/2020      Comment:      <0.03       Last total testosterone:  No results found for: TESTOSTERONE    Urinalysis today:  No results found for this visit on 12/16/21.     Last BUN and creatinine:  Lab Results   Component Value Date    BUN 24 11/29/2021     Lab Results Use Yes    Comment: some       REVIEW OF SYSTEMS:  Constitutional: negative  Eyes: negative  Respiratory: negative  Cardiovascular: negative  Gastrointestinal: negative  Musculoskeletal: negative  Genitourinary: negative except for what is in HPI  Skin: negative   Neurological: negative  Hematological/Lymphatic: negative  Psychological: negative    Physical Exam:      There were no vitals filed for this visit. Patient is a 80 y.o. male in no acute distress and alert and oriented to person, place and time. NAD, A/o      Assessment and Plan      1. Prostate cancer ([facility] [address]    2. Stress incontinence (female) (male)    3. Erectile dysfunction after radical prostatectomy           Plan:       Prostate cancer: PSA undetectable, continue q 6 months  POLLY: persistent; discussed  artificial urinary sphincter at length. He is not interested. ED:  Tadalafil 20 mg PRN. Not at goal, increase to 40 mg PRN  Trial of Tadalafil  Instructed patient to take at least 45 minutes to an hour prior to sexual activity with PRN dosing  Discussed to exercise extreme caution when/if taking Nitro    Return in about 6 months (around 6/16/2022).

## 2022-06-08 LAB — PROSTATE SPECIFIC ANTIGEN: NORMAL

## 2022-06-16 ENCOUNTER — OFFICE VISIT (OUTPATIENT)
Dept: UROLOGY | Age: 72
End: 2022-06-16
Payer: MEDICARE

## 2022-06-16 VITALS
HEIGHT: 71 IN | SYSTOLIC BLOOD PRESSURE: 126 MMHG | WEIGHT: 255 LBS | BODY MASS INDEX: 35.7 KG/M2 | DIASTOLIC BLOOD PRESSURE: 70 MMHG

## 2022-06-16 DIAGNOSIS — C61 PROSTATE CANCER (HCC): Primary | ICD-10-CM

## 2022-06-16 DIAGNOSIS — N52.31 ERECTILE DYSFUNCTION AFTER RADICAL PROSTATECTOMY: ICD-10-CM

## 2022-06-16 DIAGNOSIS — N39.3 STRESS INCONTINENCE (FEMALE) (MALE): ICD-10-CM

## 2022-06-16 PROCEDURE — G8417 CALC BMI ABV UP PARAM F/U: HCPCS | Performed by: UROLOGY

## 2022-06-16 PROCEDURE — G8427 DOCREV CUR MEDS BY ELIG CLIN: HCPCS | Performed by: UROLOGY

## 2022-06-16 PROCEDURE — 99214 OFFICE O/P EST MOD 30 MIN: CPT | Performed by: UROLOGY

## 2022-06-16 PROCEDURE — 1123F ACP DISCUSS/DSCN MKR DOCD: CPT | Performed by: UROLOGY

## 2022-06-16 PROCEDURE — 1036F TOBACCO NON-USER: CPT | Performed by: UROLOGY

## 2022-06-16 PROCEDURE — 3017F COLORECTAL CA SCREEN DOC REV: CPT | Performed by: UROLOGY

## 2022-06-16 NOTE — PROGRESS NOTES
Cheryl Osuna MD   Urology Clinic Progress Note      Patient:  Graham Aguilar  YOB: 1950  Date: 6/16/2022    HISTORY OF PRESENT ILLNESS:   The patient is a 80 y.o. male who presents today for follow-up for the following problem(s):      1. Prostate cancer ([address]    2. Stress incontinence (female) (male)    3. Erectile dysfunction after radical prostatectomy           Overall the problem(s) : unchanged. Associated Symptoms: No dysuria, gross hematuria. Pain Severity:  1/10    Summary of old records:   Graham Aguilar f/u today for Prostate Cancer. He underwent RALRP with Dr. Geraldo Lofton on 03/24/2017. Surgical Pathology revealed invasive adenocarcinoma of prostate, kendall's combined score 3+4=7, grade group 2. No evidence of extraprostatic extension, margins clear, no seminal vesical involvement, bilateral lymph node dissection negative for malignancy. PSA remains undetectable at <0.03. He continues to do well. Stress incontinence remains, wears pads, goes through about 4 daily. Denies any erections, has a sensation, has a pump, however has not used this yet. Not interested in Trimix at this time. He comes in today by himself. Hx is obtained from the patient and the medical record    Additional History: Onset 2017 RALP  Severity is described as mild-moderate. some POLLY  Lower urinary tract symptoms:  Stable non-bptherome moderate POLLY- wears 3-4 pads day, worse with activity    normal stream, no hematuria  IPPS 6   QOL 1  No UTIs      Last several PSA's:  Lab Results   Component Value Date    PSA  06/08/2022      Comment:      <0.03    PSA  06/09/2021      Comment:      <0.03    PSA  12/10/2020      Comment:      <0.03       Last total testosterone:  No results found for: TESTOSTERONE    Urinalysis today:  No results found for this visit on 06/16/22.     Last BUN and creatinine:  Lab Results   Component Value Date    BUN 24 11/29/2021     Lab Results   Component Value Date    CREATININE 1.3 11/29/2021       Imaging Reviewed during this Office Visit:   (results were independently reviewed by physician and radiology report verified)    PAST MEDICAL, FAMILY AND SOCIAL HISTORY UPDATE:  Past Medical History:   Diagnosis Date    Appendicitis 1956    Hyperlipidemia     Hypertension      Past Surgical History:   Procedure Laterality Date    APPENDECTOMY      COLONOSCOPY      OTHER SURGICAL HISTORY  03/24/2017    Lysis of adhesions    PROSTATECTOMY  03/24/2017    Robotic, bilateral pelvic lymphadenectomy     Family History   Problem Relation Age of Onset    Cancer Mother         breast    Breast Cancer Mother     High Blood Pressure Mother     Cancer Father         lung    Heart Disease Neg Hx     Diabetes Neg Hx     Stroke Neg Hx      Outpatient Medications Marked as Taking for the 6/16/22 encounter (Office Visit) with Jong Silva MD   Medication Sig Dispense Refill    amiodarone (CORDARONE) 200 MG tablet       spironolactone (ALDACTONE) 25 MG tablet Take 0.5 tablets by mouth daily (Patient taking differently: Take 12.5 mg by mouth daily Every other day) 90 tablet 1    aspirin 81 MG tablet Take 81 mg by mouth daily      Multiple Vitamins-Minerals (MULTIVITAMIN PO) Take by mouth daily      niacin 500 MG extended release capsule Take 500 mg by mouth nightly      lisinopril-hydrochlorothiazide (PRINZIDE;ZESTORETIC) 20-25 MG per tablet Take 1 tablet by mouth 2 times daily       carvedilol (COREG) 12.5 MG tablet Take 25 mg by mouth 2 times daily (with meals)       atorvastatin (LIPITOR) 40 MG tablet Take 40 mg by mouth nightly          Amoxicillin and Penicillins  Social History     Tobacco Use   Smoking Status Never Smoker   Smokeless Tobacco Never Used       Social History     Substance and Sexual Activity   Alcohol Use Yes    Comment: some       REVIEW OF SYSTEMS:  Constitutional: negative  Eyes: negative  Respiratory: negative  Cardiovascular: negative  Gastrointestinal: negative  Musculoskeletal: negative  Genitourinary: negative except for what is in HPI  Skin: negative   Neurological: negative  Hematological/Lymphatic: negative  Psychological: negative    Physical Exam:      Vitals:    06/16/22 1109   BP: 126/70     Patient is a 80 y.o. male in no acute distress and alert and oriented to person, place and time. NAD, A/o      Assessment and Plan      1. Prostate cancer ([facility] [address]    2. Stress incontinence (female) (male)    3. Erectile dysfunction after radical prostatectomy           Plan:       Prostate cancer: PSA undetectable, continue q 6 months  POLLY: persistent; discussed  artificial urinary sphincter at length. He is not interested. ED:  Tadalafil 20 mg PRN. Not at goal, increase to 40 mg PRN  Trial of Tadalafil did not work  He is not interested in [facility]    5 years from surgery; in remission  Can go to annual surveillance  Fu 1 year with PSA    Return in about 1 year (around 6/16/2023).

## 2022-10-31 RX ORDER — SPIRONOLACTONE 25 MG/1
12.5 TABLET ORAL DAILY
Qty: 45 TABLET | Refills: 3 | Status: SHIPPED | OUTPATIENT
Start: 2022-10-31 | End: 2023-01-29

## 2022-11-21 LAB
BASOPHILS ABSOLUTE: NORMAL
BASOPHILS RELATIVE PERCENT: NORMAL
BUN BLDV-MCNC: 23 MG/DL
CALCIUM SERPL-MCNC: 9.6 MG/DL
CHLORIDE BLD-SCNC: 102 MMOL/L
CO2: 29 MMOL/L
CREAT SERPL-MCNC: 1.1 MG/DL
EOSINOPHILS ABSOLUTE: NORMAL
EOSINOPHILS RELATIVE PERCENT: NORMAL
GFR CALCULATED: 67
GLUCOSE BLD-MCNC: 134 MG/DL
HCT VFR BLD CALC: 42.8 % (ref 41–53)
HEMOGLOBIN: 14 G/DL (ref 13.5–17.5)
LYMPHOCYTES ABSOLUTE: NORMAL
LYMPHOCYTES RELATIVE PERCENT: NORMAL
MCH RBC QN AUTO: NORMAL PG
MCHC RBC AUTO-ENTMCNC: NORMAL G/DL
MCV RBC AUTO: NORMAL FL
MONOCYTES ABSOLUTE: NORMAL
MONOCYTES RELATIVE PERCENT: NORMAL
NEUTROPHILS ABSOLUTE: NORMAL
NEUTROPHILS RELATIVE PERCENT: NORMAL
PHOSPHORUS: 3 MG/DL
PLATELET # BLD: 209 K/ΜL
PMV BLD AUTO: NORMAL FL
POTASSIUM SERPL-SCNC: 3.6 MMOL/L
RBC # BLD: 4.56 10^6/ΜL
SODIUM BLD-SCNC: 141 MMOL/L
VITAMIN D 25-HYDROXY: 40
VITAMIN D2, 25 HYDROXY: NORMAL
VITAMIN D3,25 HYDROXY: NORMAL
WBC # BLD: 6 10^3/ML

## 2022-12-01 ENCOUNTER — OFFICE VISIT (OUTPATIENT)
Dept: NEPHROLOGY | Age: 72
End: 2022-12-01
Payer: MEDICARE

## 2022-12-01 VITALS
WEIGHT: 248 LBS | BODY MASS INDEX: 34.59 KG/M2 | HEART RATE: 54 BPM | DIASTOLIC BLOOD PRESSURE: 70 MMHG | OXYGEN SATURATION: 98 % | SYSTOLIC BLOOD PRESSURE: 130 MMHG

## 2022-12-01 DIAGNOSIS — N18.2 CKD (CHRONIC KIDNEY DISEASE), STAGE II: Primary | ICD-10-CM

## 2022-12-01 PROCEDURE — 3074F SYST BP LT 130 MM HG: CPT | Performed by: INTERNAL MEDICINE

## 2022-12-01 PROCEDURE — G8484 FLU IMMUNIZE NO ADMIN: HCPCS | Performed by: INTERNAL MEDICINE

## 2022-12-01 PROCEDURE — 99213 OFFICE O/P EST LOW 20 MIN: CPT | Performed by: INTERNAL MEDICINE

## 2022-12-01 PROCEDURE — G8417 CALC BMI ABV UP PARAM F/U: HCPCS | Performed by: INTERNAL MEDICINE

## 2022-12-01 PROCEDURE — G8427 DOCREV CUR MEDS BY ELIG CLIN: HCPCS | Performed by: INTERNAL MEDICINE

## 2022-12-01 PROCEDURE — 1036F TOBACCO NON-USER: CPT | Performed by: INTERNAL MEDICINE

## 2022-12-01 PROCEDURE — 3017F COLORECTAL CA SCREEN DOC REV: CPT | Performed by: INTERNAL MEDICINE

## 2022-12-01 PROCEDURE — 3078F DIAST BP <80 MM HG: CPT | Performed by: INTERNAL MEDICINE

## 2022-12-01 PROCEDURE — 1123F ACP DISCUSS/DSCN MKR DOCD: CPT | Performed by: INTERNAL MEDICINE

## 2022-12-01 NOTE — PROGRESS NOTES
[facility]    [facility], [address]   [facility], [address]  [phone]  Progress Note  12/1/2022 9:30 AM      Pt Name:    Charlie Crews  YOB: 1950  Primary Care Physician:  Galilea Bryson MD     Chief Complaint:   Chief Complaint   Patient presents with    Follow-up     CKD III        History of Present Illness: This is a follow-up visit for CKD II/III. Hx of prostate cancer s/p prostatectomy 2017 , stress incontinence, HTN, Afib on Amio. He is a  for [employer]. He's doing well. No hospitalizations this year. Med changes amio was reduced otherwise no new changes. He feels ok, no concerns. Pertinent items are noted in HPI. Past History:  Past Medical History:   Diagnosis Date    Appendicitis 1956    Hyperlipidemia     Hypertension      Past Surgical History:   Procedure Laterality Date    APPENDECTOMY      COLONOSCOPY      OTHER SURGICAL HISTORY  03/24/2017    Lysis of adhesions    PROSTATECTOMY  03/24/2017    Robotic, bilateral pelvic lymphadenectomy        VITALS:  /70 (Site: Right Upper Arm, Position: Sitting, Cuff Size: Large Adult)   Pulse 54   Wt 248 lb (112.5 kg)   SpO2 98%   BMI 34.59 kg/m²   Wt Readings from Last 3 Encounters:   12/01/22 248 lb (112.5 kg)   06/16/22 255 lb (115.7 kg)   12/16/21 255 lb (115.7 kg)     Body mass index is 34.59 kg/m².      General Appearance: alert and cooperative with exam, appears comfortable, no distress  HEENT: EOMI, moist oral mucus membranes  Neck: No jugular venous distention,  Lungs: Air entry B/L, no crackles or rales, no use of accessory muscles  Heart: tachycardic  GI: soft, non-tender, no guarding,  Extremities: trace LE edema, no cyanosis  Skin: warm, dry  Neurologic: no tremor, no asterixis,     Medications:  Current Outpatient Medications   Medication Sig Dispense Refill    spironolactone (ALDACTONE) 25 MG tablet Take 0.5 tablets by mouth daily Every other day 45 tablet 3    amiodarone (CORDARONE) 200 MG tablet Take by mouth every other day      aspirin 81 MG tablet Take 81 mg by mouth daily      Multiple Vitamins-Minerals (MULTIVITAMIN PO) Take by mouth daily      niacin 500 MG extended release capsule Take 500 mg by mouth nightly      lisinopril-hydrochlorothiazide (PRINZIDE;ZESTORETIC) 20-25 MG per tablet Take 1 tablet by mouth 2 times daily       carvedilol (COREG) 12.5 MG tablet Take 25 mg by mouth 2 times daily (with meals)       atorvastatin (LIPITOR) 40 MG tablet Take 40 mg by mouth nightly        No current facility-administered medications for this visit.         Laboratory & Diagnostics:  CBC:   Lab Results   Component Value Date    WBC 6.0 11/21/2022    HGB 14.0 11/21/2022    HCT 42.8 11/21/2022    MCV 87.6 04/03/2017     11/21/2022     BMP:    Lab Results   Component Value Date     11/21/2022     11/29/2021     11/23/2020    K 3.6 11/21/2022    K 4.2 11/29/2021    K 4.5 11/23/2020     11/21/2022     11/29/2021     11/23/2020    CO2 29 11/21/2022    CO2 25.0 11/29/2021    CO2 27.0 11/23/2020    BUN 23 11/21/2022    BUN 24 11/29/2021    BUN 26 11/23/2020    CREATININE 1.1 11/21/2022    CREATININE 1.3 11/29/2021    CREATININE 1.3 11/23/2020    GLUCOSE 134 11/21/2022    GLUCOSE 139 11/29/2021    GLUCOSE 120 11/23/2020      Hepatic:   Lab Results   Component Value Date    AST 25 01/21/2020    AST 21 04/03/2017    AST 25 02/21/2017    ALT 37 01/21/2020    ALT 31 04/03/2017    ALT 30 02/21/2017    BILITOT 0.4 01/21/2020    BILITOT 0.7 04/03/2017    BILITOT 0.7 02/21/2017    ALKPHOS 66 01/21/2020    ALKPHOS 84 04/03/2017    ALKPHOS 69 02/21/2017     BNP: No results found for: BNP  Lipids:   Lab Results   Component Value Date    CHOL 119 01/21/2020    HDL 32 (A) 01/21/2020     INR:   Lab Results   Component Value Date    INR 1.01 03/24/2017     URINE:   Lab Results   Component Value Date/Time    PROTUR Negative 05/13/2019 10:04 AM     Lab Results   Component Value Date/Time    NITRU Negative 05/13/2019 10:04 AM    COLORU Yellow 05/13/2019 10:04 AM    COLORU Yellow 12/12/2017 03:59 PM    PHUR 5.0 12/12/2017 03:59 PM    CLARITYU Clear 12/12/2017 03:59 PM    SPECGRAV 1.020 02/28/2017 12:00 AM    LEUKOCYTESUR negative 12/12/2017 03:59 PM    UROBILINOGEN 0.20 05/13/2019 10:04 AM    BILIRUBINUR Negative 05/13/2019 10:04 AM    BLOODU Negative 05/13/2019 10:04 AM    BLOODU Negative 12/12/2017 03:59 PM    GLUCOSEU Negative 05/13/2019 10:04 AM    KETUA Negative 05/13/2019 10:04 AM      Microalbumen/Creatinine ratio:  No components found for: RUCREAT        Impression/Plan:   1. 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 Goals of care include slowing rate of progression by controlling blood pressure and by avoiding nephrotoxins such as NSAIDs and IV contrast.  2. Hypertension -controlled  3. AFib  4. Left renal cyst   5. Hx prostate CA s/p prostatectomy :Follows with urology          Bloodwork and medications were reviewed and plan of care discussed with the patient. Return to clinic in 1 year  or sooner if the need arises.       Gregory Barahona,   [facility]

## 2023-05-01 LAB
ALBUMIN SERPL-MCNC: 4.5 G/DL
ALP BLD-CCNC: 66 U/L
ALT SERPL-CCNC: 37 U/L
ANION GAP SERPL CALCULATED.3IONS-SCNC: 10 MMOL/L
AST SERPL-CCNC: 26 U/L
BASOPHILS ABSOLUTE: NORMAL
BASOPHILS RELATIVE PERCENT: NORMAL
BILIRUB SERPL-MCNC: 0.7 MG/DL (ref 0.1–1.4)
BUN BLDV-MCNC: 23 MG/DL
CALCIUM SERPL-MCNC: 9.8 MG/DL
CHLORIDE BLD-SCNC: 102 MMOL/L
CHOLESTEROL, FASTING: 116
CO2: 25 MMOL/L
CREAT SERPL-MCNC: 1 MG/DL
EGFR: 75
EOSINOPHILS ABSOLUTE: NORMAL
EOSINOPHILS RELATIVE PERCENT: NORMAL
GLUCOSE BLD-MCNC: 126 MG/DL
HCT VFR BLD CALC: 45.5 % (ref 41–53)
HDLC SERPL-MCNC: 35 MG/DL (ref 35–70)
HEMOGLOBIN: 15.1 G/DL (ref 13.5–17.5)
LDL CHOLESTEROL CALCULATED: 45 MG/DL (ref 0–160)
LYMPHOCYTES ABSOLUTE: NORMAL
LYMPHOCYTES RELATIVE PERCENT: NORMAL
MAGNESIUM: 2 MG/DL
MCH RBC QN AUTO: NORMAL PG
MCHC RBC AUTO-ENTMCNC: NORMAL G/DL
MCV RBC AUTO: NORMAL FL
MONOCYTES ABSOLUTE: NORMAL
MONOCYTES RELATIVE PERCENT: NORMAL
NEUTROPHILS ABSOLUTE: NORMAL
NEUTROPHILS RELATIVE PERCENT: NORMAL
PDW BLD-RTO: NORMAL %
PLATELET # BLD: 178 K/ΜL
PMV BLD AUTO: NORMAL FL
POTASSIUM SERPL-SCNC: 4.1 MMOL/L
RBC # BLD: 4.89 10^6/ΜL
SODIUM BLD-SCNC: 137 MMOL/L
TOTAL PROTEIN: 6.6
TRIGLYCERIDE, FASTING: 179
TSH SERPL DL<=0.05 MIU/L-ACNC: 1.52 UIU/ML
WBC # BLD: 5.5 10^3/ML

## 2023-06-05 LAB — PROSTATE SPECIFIC ANTIGEN: NORMAL

## 2023-06-08 ENCOUNTER — OFFICE VISIT (OUTPATIENT)
Dept: UROLOGY | Age: 73
End: 2023-06-08
Payer: MEDICARE

## 2023-06-08 VITALS
HEIGHT: 71 IN | BODY MASS INDEX: 36.12 KG/M2 | DIASTOLIC BLOOD PRESSURE: 74 MMHG | WEIGHT: 258 LBS | SYSTOLIC BLOOD PRESSURE: 142 MMHG

## 2023-06-08 DIAGNOSIS — N52.31 ERECTILE DYSFUNCTION AFTER RADICAL PROSTATECTOMY: ICD-10-CM

## 2023-06-08 DIAGNOSIS — N39.3 STRESS INCONTINENCE (FEMALE) (MALE): ICD-10-CM

## 2023-06-08 DIAGNOSIS — C61 PROSTATE CANCER (HCC): ICD-10-CM

## 2023-06-08 LAB
BILIRUBIN URINE: NEGATIVE
BLOOD URINE, POC: NEGATIVE
CHARACTER, URINE: CLEAR
COLOR, URINE: YELLOW
GLUCOSE URINE: NEGATIVE MG/DL
KETONES, URINE: NEGATIVE
LEUKOCYTE CLUMPS, URINE: NEGATIVE
NITRITE, URINE: NEGATIVE
PH, URINE: 5 (ref 5–9)
PROTEIN, URINE: NEGATIVE MG/DL
SPECIFIC GRAVITY, URINE: >= 1.03 (ref 1–1.03)
UROBILINOGEN, URINE: 0.2 EU/DL (ref 0–1)

## 2023-06-08 PROCEDURE — G8427 DOCREV CUR MEDS BY ELIG CLIN: HCPCS

## 2023-06-08 PROCEDURE — 1123F ACP DISCUSS/DSCN MKR DOCD: CPT

## 2023-06-08 PROCEDURE — 3077F SYST BP >= 140 MM HG: CPT

## 2023-06-08 PROCEDURE — 3078F DIAST BP <80 MM HG: CPT

## 2023-06-08 PROCEDURE — 3017F COLORECTAL CA SCREEN DOC REV: CPT

## 2023-06-08 PROCEDURE — 1036F TOBACCO NON-USER: CPT

## 2023-06-08 PROCEDURE — G8417 CALC BMI ABV UP PARAM F/U: HCPCS

## 2023-06-08 PROCEDURE — 99214 OFFICE O/P EST MOD 30 MIN: CPT

## 2023-06-08 PROCEDURE — 81003 URINALYSIS AUTO W/O SCOPE: CPT

## 2023-06-08 NOTE — PROGRESS NOTES
No outpatient medications have been marked as taking for the 6/8/23 encounter (Appointment) with Bryn Davis PA-C. Amoxicillin and Penicillins  Social History     Tobacco Use   Smoking Status Never   Smokeless Tobacco Never       Social History     Substance and Sexual Activity   Alcohol Use Yes    Comment: some       REVIEW OF SYSTEMS:  Constitutional: negative  Eyes: negative  Respiratory: negative  Cardiovascular: negative  Gastrointestinal: negative  Musculoskeletal: negative  Genitourinary: negative except for what is in HPI  Skin: negative   Neurological: negative  Hematological/Lymphatic: negative  Psychological: negative    Physical Exam:    There were no vitals filed for this visit. Patient is a 71 y.o. male in no acute distress and alert and oriented to person, place and time. NAD, A/o  Non labored respiration  Normal peripheral pulses  Skin- warm and dry  Psych- normal mood and affect      Assessment and Plan     1. Prostate cancer ([address]    2. Stress incontinence (female) (male)    3. Erectile dysfunction after radical prostatectomy      Prostate Cancer: PSA stable. Recheck in 1 year. Denies changes in urination. Encouraged to call should this change. Reports that he also has his PCP check his PSA so that it is assessed every 6 months, as this provides him with peace of mind. Stress urinary incontinence: persistent; discussed  artificial urinary sphincter at length with Dr. Sophia Jenkins. He was not interested. ED: Trial of Tadalafil did not work. The patient has a sensation, has a pump, however has not used this yet. Not interested in Trimix at this time.      5 years from surgery; in remission  Can go to annual surveillance  Fu 1 year with PSA    Bryn Davis PA-C  Urology

## 2023-06-08 NOTE — PATIENT INSTRUCTIONS
-PSA in 1 year  -Call with questions, comments, or concerns. I recommend going to the ED for further evaluation if you develop fever, chills, nausea, vomiting, chest pain, SOB, or calf pain.

## 2023-11-30 LAB
BUN BLDV-MCNC: 23 MG/DL
CALCIUM SERPL-MCNC: 9.3 MG/DL
CHLORIDE BLD-SCNC: 102 MMOL/L
CO2: 9.3 MMOL/L
CREAT SERPL-MCNC: 1.2 MG/DL
CREATININE, URINE: 117.4
EGFR: 60
GLUCOSE BLD-MCNC: 29 MG/DL
MICROALBUMIN/CREAT 24H UR: 2.3 MG/G{CREAT}
MICROALBUMIN/CREAT UR-RTO: 20
POTASSIUM SERPL-SCNC: 4 MMOL/L
SODIUM BLD-SCNC: 141 MMOL/L

## 2023-12-07 ENCOUNTER — OFFICE VISIT (OUTPATIENT)
Dept: NEPHROLOGY | Age: 73
End: 2023-12-07
Payer: MEDICARE

## 2023-12-07 VITALS
DIASTOLIC BLOOD PRESSURE: 70 MMHG | SYSTOLIC BLOOD PRESSURE: 118 MMHG | OXYGEN SATURATION: 98 % | BODY MASS INDEX: 35.7 KG/M2 | WEIGHT: 256 LBS | HEART RATE: 54 BPM

## 2023-12-07 DIAGNOSIS — N18.2 CKD (CHRONIC KIDNEY DISEASE), STAGE II: Primary | ICD-10-CM

## 2023-12-07 PROCEDURE — G8484 FLU IMMUNIZE NO ADMIN: HCPCS | Performed by: INTERNAL MEDICINE

## 2023-12-07 PROCEDURE — G8417 CALC BMI ABV UP PARAM F/U: HCPCS | Performed by: INTERNAL MEDICINE

## 2023-12-07 PROCEDURE — G8427 DOCREV CUR MEDS BY ELIG CLIN: HCPCS | Performed by: INTERNAL MEDICINE

## 2023-12-07 PROCEDURE — 1036F TOBACCO NON-USER: CPT | Performed by: INTERNAL MEDICINE

## 2023-12-07 PROCEDURE — 3017F COLORECTAL CA SCREEN DOC REV: CPT | Performed by: INTERNAL MEDICINE

## 2023-12-07 PROCEDURE — 99213 OFFICE O/P EST LOW 20 MIN: CPT | Performed by: INTERNAL MEDICINE

## 2023-12-07 PROCEDURE — 3078F DIAST BP <80 MM HG: CPT | Performed by: INTERNAL MEDICINE

## 2023-12-07 PROCEDURE — 1123F ACP DISCUSS/DSCN MKR DOCD: CPT | Performed by: INTERNAL MEDICINE

## 2023-12-07 PROCEDURE — 3074F SYST BP LT 130 MM HG: CPT | Performed by: INTERNAL MEDICINE

## 2024-01-15 RX ORDER — SPIRONOLACTONE 25 MG/1
TABLET ORAL
Qty: 45 TABLET | Refills: 3 | Status: SHIPPED | OUTPATIENT
Start: 2024-01-15

## 2024-06-24 ENCOUNTER — SCHEDULED TELEPHONE ENCOUNTER (OUTPATIENT)
Dept: UROLOGY | Age: 74
End: 2024-06-24
Payer: MEDICARE

## 2024-06-24 DIAGNOSIS — C61 PROSTATE CANCER (HCC): Primary | ICD-10-CM

## 2024-06-24 PROCEDURE — 99441 PR PHYS/QHP TELEPHONE EVALUATION 5-10 MIN: CPT

## 2024-06-24 NOTE — PROGRESS NOTES
[facility] [place] [facility]  [facility]  [address]  [address]  [facility] [address]  Dept: [phone]  Loc: [phone]    Visit Date: 6/24/2024        HPI:     HPI  Mr. Rene is a 73-year-old male that follows in the clinic for prostate cancer, stress incontinence, and ED after a radical prostatectomy.      He underwent RALRP with Dr. Kraus on 03/24/2017. Surgical Pathology revealed invasive adenocarcinoma of prostate, kendall's combined score 3+4=7, grade group 2. No evidence of extraprostatic extension, margins clear, no seminal vesical involvement, bilateral lymph node dissection negative for malignancy.     Stress incontinence persists, wears pads, goes through about 4 daily.  Not bothered enough to undergo any additional procedures, etc     Denies any erections.Tadalafil did not work despite dosing adjustments. The patient does have a penile pump. However, he states he does not generally use it.     CT imaging in 12/2019 with Bosniak 2 L renal cyst. Previous imaging in 10/2017 reported a benign cyst    Current Outpatient Medications   Medication Sig Dispense Refill    spironolactone (ALDACTONE) 25 MG tablet TAKE 0.5 TABLETS BY MOUTH EVERY OTHER DAY 45 tablet 3    amiodarone (CORDARONE) 200 MG tablet Take by mouth every other day      aspirin 81 MG tablet Take 1 tablet by mouth daily      Multiple Vitamins-Minerals (MULTIVITAMIN PO) Take by mouth daily      niacin 500 MG extended release capsule Take 1 capsule by mouth nightly      lisinopril-hydrochlorothiazide (PRINZIDE;ZESTORETIC) 20-25 MG per tablet Take 1 tablet by mouth 2 times daily      carvedilol (COREG) 12.5 MG tablet Take 2 tablets by mouth 2 times daily (with meals)      atorvastatin (LIPITOR) 40 MG tablet Take 1 tablet by mouth nightly 20 mg one day alternating 40 mg the next       No current facility-administered medications for this visit.       Past Medical History  Eliel  has a past medical history of

## 2024-06-26 ENCOUNTER — TELEPHONE (OUTPATIENT)
Dept: NEPHROLOGY | Age: 74
End: 2024-06-26

## 2024-06-26 NOTE — TELEPHONE ENCOUNTER
Nery from [facility] cardiology phoned - pt needs a cardiac cath done and they want to know if dr Bates wants hydration orders done? [phone] FAX NUMBER

## 2024-06-26 NOTE — TELEPHONE ENCOUNTER
Pt last seen in Dec 2023 by Dr. Bates and no recent kidney labs available.   Pls have patient do BMP first

## 2024-11-27 LAB
BUN BLDV-MCNC: 22 MG/DL
CALCIUM SERPL-MCNC: 9.6 MG/DL
CHLORIDE BLD-SCNC: 103 MMOL/L
CO2: 26 MMOL/L
CREAT SERPL-MCNC: 1 MG/DL
CREATININE URINE: 125.1 MG/DL
EGFR: 74
GLUCOSE BLD-MCNC: 135 MG/DL
MICROALBUMIN/CREAT 24H UR: 1.8 MG/DL
MICROALBUMIN/CREAT UR-RTO: 14 MG/G
POTASSIUM SERPL-SCNC: 4 MMOL/L
SODIUM BLD-SCNC: 140 MMOL/L

## 2024-12-05 ENCOUNTER — OFFICE VISIT (OUTPATIENT)
Dept: NEPHROLOGY | Age: 74
End: 2024-12-05
Payer: MEDICARE

## 2024-12-05 VITALS
WEIGHT: 250 LBS | BODY MASS INDEX: 34.87 KG/M2 | OXYGEN SATURATION: 98 % | HEART RATE: 50 BPM | SYSTOLIC BLOOD PRESSURE: 140 MMHG | DIASTOLIC BLOOD PRESSURE: 74 MMHG

## 2024-12-05 DIAGNOSIS — N18.2 CKD (CHRONIC KIDNEY DISEASE), STAGE II: Primary | ICD-10-CM

## 2024-12-05 PROCEDURE — 3017F COLORECTAL CA SCREEN DOC REV: CPT | Performed by: INTERNAL MEDICINE

## 2024-12-05 PROCEDURE — 1159F MED LIST DOCD IN RCRD: CPT | Performed by: INTERNAL MEDICINE

## 2024-12-05 PROCEDURE — 1123F ACP DISCUSS/DSCN MKR DOCD: CPT | Performed by: INTERNAL MEDICINE

## 2024-12-05 PROCEDURE — 3077F SYST BP >= 140 MM HG: CPT | Performed by: INTERNAL MEDICINE

## 2024-12-05 PROCEDURE — G8417 CALC BMI ABV UP PARAM F/U: HCPCS | Performed by: INTERNAL MEDICINE

## 2024-12-05 PROCEDURE — 1036F TOBACCO NON-USER: CPT | Performed by: INTERNAL MEDICINE

## 2024-12-05 PROCEDURE — 99214 OFFICE O/P EST MOD 30 MIN: CPT | Performed by: INTERNAL MEDICINE

## 2024-12-05 PROCEDURE — G8484 FLU IMMUNIZE NO ADMIN: HCPCS | Performed by: INTERNAL MEDICINE

## 2024-12-05 PROCEDURE — G8427 DOCREV CUR MEDS BY ELIG CLIN: HCPCS | Performed by: INTERNAL MEDICINE

## 2024-12-05 PROCEDURE — 3078F DIAST BP <80 MM HG: CPT | Performed by: INTERNAL MEDICINE

## 2024-12-05 NOTE — PROGRESS NOTES
day      aspirin 81 MG tablet Take 1 tablet by mouth daily      Multiple Vitamins-Minerals (MULTIVITAMIN PO) Take by mouth daily      niacin 500 MG extended release capsule Take 1 capsule by mouth nightly      lisinopril-hydrochlorothiazide (PRINZIDE;ZESTORETIC) 20-25 MG per tablet Take 1 tablet by mouth 2 times daily      carvedilol (COREG) 12.5 MG tablet Take 2 tablets by mouth 2 times daily (with meals)      atorvastatin (LIPITOR) 40 MG tablet Take 1 tablet by mouth nightly 20 mg one day alternating 40 mg the next       No current facility-administered medications for this visit.        Laboratory & Diagnostics:  CBC:   Lab Results   Component Value Date    WBC 6.6 11/05/2024    HGB 14.1 11/05/2024    HCT 43.1 11/05/2024    MCV 93.5 11/05/2024     11/05/2024     BMP:    Lab Results   Component Value Date     11/27/2024     11/05/2024     06/27/2024    K 4.0 11/27/2024    K 3.9 11/05/2024    K 4.1 06/27/2024     11/27/2024     11/05/2024     06/27/2024    CO2 26 11/27/2024    CO2 26 11/05/2024    CO2 30 06/27/2024    BUN 22 11/27/2024    BUN 19 11/05/2024    BUN 21 06/27/2024    CREATININE 1.0 11/27/2024    CREATININE 0.9 11/05/2024    CREATININE 1.22 06/27/2024    GLUCOSE 135 11/27/2024    GLUCOSE 137 11/05/2024    GLUCOSE 154 06/27/2024      Hepatic:   Lab Results   Component Value Date    AST 21 11/05/2024    AST 27 11/06/2023    AST 26 05/01/2023    ALT 28 11/05/2024    ALT 32 11/06/2023    ALT 37 05/01/2023    BILITOT 0.6 11/05/2024    BILITOT 0.4 11/06/2023    BILITOT 0.7 05/01/2023    ALKPHOS 62 11/05/2024    ALKPHOS 64 11/06/2023    ALKPHOS 66 05/01/2023     BNP: No results found for: \"BNP\"  Lipids:   Lab Results   Component Value Date    CHOL 115 11/05/2024    HDL 31 (A) 11/05/2024     INR:   Lab Results   Component Value Date    INR 1.01 03/24/2017     URINE:   Lab Results   Component Value Date/Time    PROTUR Negative 06/08/2023 11:04 AM     Lab Results

## 2025-01-06 RX ORDER — SPIRONOLACTONE 25 MG/1
TABLET ORAL
Qty: 45 TABLET | Refills: 3 | Status: SHIPPED | OUTPATIENT
Start: 2025-01-06

## 2025-01-27 ENCOUNTER — TELEMEDICINE (OUTPATIENT)
Dept: UROLOGY | Age: 75
End: 2025-01-27
Payer: MEDICARE

## 2025-01-27 DIAGNOSIS — C61 PROSTATE CANCER (HCC): Primary | ICD-10-CM

## 2025-01-27 PROCEDURE — 99214 OFFICE O/P EST MOD 30 MIN: CPT

## 2025-01-27 PROCEDURE — G8417 CALC BMI ABV UP PARAM F/U: HCPCS

## 2025-01-27 PROCEDURE — 3017F COLORECTAL CA SCREEN DOC REV: CPT

## 2025-01-27 PROCEDURE — 1036F TOBACCO NON-USER: CPT

## 2025-01-27 PROCEDURE — 1123F ACP DISCUSS/DSCN MKR DOCD: CPT

## 2025-01-27 PROCEDURE — G8428 CUR MEDS NOT DOCUMENT: HCPCS

## 2025-01-27 NOTE — PROGRESS NOTES
registered, or certified to deliver care in the state where the patient is located as indicated above. If you are not or unsure, please re-schedule the visit: Yes, I confirm.       Total time spent on this encounter: Not billed by time    --Prema Juarez PA-C on 1/27/2025 at 8:31 AM    An electronic signature was used to authenticate this note.     Prema Juarez PA-C  Urology